# Patient Record
Sex: MALE | Race: WHITE
[De-identification: names, ages, dates, MRNs, and addresses within clinical notes are randomized per-mention and may not be internally consistent; named-entity substitution may affect disease eponyms.]

---

## 2017-12-20 ENCOUNTER — HOSPITAL ENCOUNTER (OUTPATIENT)
Age: 67
End: 2017-12-20
Payer: MEDICARE

## 2017-12-20 DIAGNOSIS — I51.7: ICD-10-CM

## 2017-12-20 DIAGNOSIS — Z79.01: Primary | ICD-10-CM

## 2017-12-20 DIAGNOSIS — Z51.81: ICD-10-CM

## 2017-12-20 PROCEDURE — 85610 PROTHROMBIN TIME: CPT

## 2017-12-20 PROCEDURE — G0463 HOSPITAL OUTPT CLINIC VISIT: HCPCS

## 2017-12-20 PROCEDURE — 99211 OFF/OP EST MAY X REQ PHY/QHP: CPT

## 2017-12-21 ENCOUNTER — HOSPITAL ENCOUNTER (INPATIENT)
Age: 67
LOS: 8 days | Discharge: HOME | DRG: 190 | End: 2017-12-29
Payer: MEDICARE

## 2017-12-21 DIAGNOSIS — J44.1: Primary | ICD-10-CM

## 2017-12-21 DIAGNOSIS — Z72.0: ICD-10-CM

## 2017-12-21 DIAGNOSIS — Z79.01: ICD-10-CM

## 2017-12-21 DIAGNOSIS — I50.9: ICD-10-CM

## 2017-12-21 DIAGNOSIS — I10: ICD-10-CM

## 2017-12-21 DIAGNOSIS — E87.6: ICD-10-CM

## 2017-12-21 DIAGNOSIS — I26.99: ICD-10-CM

## 2017-12-21 PROCEDURE — 71010: CPT

## 2017-12-21 PROCEDURE — 96375 TX/PRO/DX INJ NEW DRUG ADDON: CPT

## 2017-12-21 PROCEDURE — 93005 ELECTROCARDIOGRAM TRACING: CPT

## 2017-12-21 PROCEDURE — A9540 TC99M MAA: HCPCS

## 2017-12-21 PROCEDURE — 83880 ASSAY OF NATRIURETIC PEPTIDE: CPT

## 2017-12-21 PROCEDURE — 84484 ASSAY OF TROPONIN QUANT: CPT

## 2017-12-21 PROCEDURE — 87077 CULTURE AEROBIC IDENTIFY: CPT

## 2017-12-21 PROCEDURE — 80053 COMPREHEN METABOLIC PANEL: CPT

## 2017-12-21 PROCEDURE — 82553 CREATINE MB FRACTION: CPT

## 2017-12-21 PROCEDURE — 87205 SMEAR GRAM STAIN: CPT

## 2017-12-21 PROCEDURE — 70450 CT HEAD/BRAIN W/O DYE: CPT

## 2017-12-21 PROCEDURE — 87040 BLOOD CULTURE FOR BACTERIA: CPT

## 2017-12-21 PROCEDURE — 87070 CULTURE OTHR SPECIMN AEROBIC: CPT

## 2017-12-21 PROCEDURE — G0378 HOSPITAL OBSERVATION PER HR: HCPCS

## 2017-12-21 PROCEDURE — 72125 CT NECK SPINE W/O DYE: CPT

## 2017-12-21 PROCEDURE — 94640 AIRWAY INHALATION TREATMENT: CPT

## 2017-12-21 PROCEDURE — 96365 THER/PROPH/DIAG IV INF INIT: CPT

## 2017-12-21 PROCEDURE — 36415 COLL VENOUS BLD VENIPUNCTURE: CPT

## 2017-12-21 PROCEDURE — 82803 BLOOD GASES ANY COMBINATION: CPT

## 2017-12-21 PROCEDURE — 85025 COMPLETE CBC W/AUTO DIFF WBC: CPT

## 2017-12-21 PROCEDURE — 93970 EXTREMITY STUDY: CPT

## 2017-12-21 PROCEDURE — 99285 EMERGENCY DEPT VISIT HI MDM: CPT

## 2017-12-21 PROCEDURE — G0463 HOSPITAL OUTPT CLINIC VISIT: HCPCS

## 2017-12-21 PROCEDURE — 85610 PROTHROMBIN TIME: CPT

## 2017-12-21 PROCEDURE — 94760 N-INVAS EAR/PLS OXIMETRY 1: CPT

## 2017-12-21 PROCEDURE — 93041 RHYTHM ECG TRACING: CPT

## 2017-12-21 PROCEDURE — 83605 ASSAY OF LACTIC ACID: CPT

## 2017-12-21 PROCEDURE — 97163 PT EVAL HIGH COMPLEX 45 MIN: CPT

## 2017-12-21 PROCEDURE — 87186 SC STD MICRODIL/AGAR DIL: CPT

## 2017-12-21 PROCEDURE — 94660 CPAP INITIATION&MGMT: CPT

## 2017-12-21 PROCEDURE — 71020: CPT

## 2017-12-21 PROCEDURE — 78582 LUNG VENTILAT&PERFUS IMAGING: CPT

## 2017-12-21 PROCEDURE — 97530 THERAPEUTIC ACTIVITIES: CPT

## 2017-12-21 PROCEDURE — 85378 FIBRIN DEGRADE SEMIQUANT: CPT

## 2017-12-21 PROCEDURE — 99211 OFF/OP EST MAY X REQ PHY/QHP: CPT

## 2017-12-21 PROCEDURE — 80048 BASIC METABOLIC PNL TOTAL CA: CPT

## 2017-12-21 PROCEDURE — 73562 X-RAY EXAM OF KNEE 3: CPT

## 2017-12-21 PROCEDURE — 82550 ASSAY OF CK (CPK): CPT

## 2017-12-21 PROCEDURE — A9567 TECHNETIUM TC-99M AEROSOL: HCPCS

## 2018-01-02 ENCOUNTER — HOSPITAL ENCOUNTER (OUTPATIENT)
Dept: HOSPITAL 22 - ACC | Age: 68
Discharge: HOME | End: 2018-01-02
Payer: MEDICARE

## 2018-01-02 DIAGNOSIS — Z79.01: ICD-10-CM

## 2018-01-02 DIAGNOSIS — Z51.81: Primary | ICD-10-CM

## 2018-01-02 DIAGNOSIS — I51.7: ICD-10-CM

## 2018-01-02 LAB — PHA INR FINGERSTICK: 3.8 (ref 0.9–1.1)

## 2018-01-02 PROCEDURE — 85610 PROTHROMBIN TIME: CPT

## 2018-01-18 ENCOUNTER — HOSPITAL ENCOUNTER (OUTPATIENT)
Age: 68
Discharge: HOME | End: 2018-01-18
Payer: MEDICARE

## 2018-01-18 DIAGNOSIS — Z79.01: Primary | ICD-10-CM

## 2018-01-18 LAB — PHA INR FINGERSTICK: 3.9 (ref 0.9–1.1)

## 2018-01-18 PROCEDURE — 99211 OFF/OP EST MAY X REQ PHY/QHP: CPT

## 2018-01-18 PROCEDURE — 85610 PROTHROMBIN TIME: CPT

## 2018-01-18 PROCEDURE — G0463 HOSPITAL OUTPT CLINIC VISIT: HCPCS

## 2018-02-01 ENCOUNTER — HOSPITAL ENCOUNTER (OUTPATIENT)
Dept: HOSPITAL 22 - ACC | Age: 68
Discharge: HOME | End: 2018-02-01
Payer: MEDICARE

## 2018-02-01 DIAGNOSIS — I51.7: ICD-10-CM

## 2018-02-01 DIAGNOSIS — Z79.01: Primary | ICD-10-CM

## 2018-02-01 DIAGNOSIS — Z51.81: ICD-10-CM

## 2018-02-01 LAB — PHA INR FINGERSTICK: 3 (ref 0.9–1.1)

## 2018-02-01 PROCEDURE — 85610 PROTHROMBIN TIME: CPT

## 2018-02-01 PROCEDURE — G0463 HOSPITAL OUTPT CLINIC VISIT: HCPCS

## 2018-02-01 PROCEDURE — 99211 OFF/OP EST MAY X REQ PHY/QHP: CPT

## 2018-02-14 ENCOUNTER — HOSPITAL ENCOUNTER (OUTPATIENT)
Dept: HOSPITAL 22 - ACC | Age: 68
Discharge: HOME | End: 2018-02-14
Payer: MEDICARE

## 2018-02-14 DIAGNOSIS — Z51.81: ICD-10-CM

## 2018-02-14 DIAGNOSIS — I51.7: ICD-10-CM

## 2018-02-14 DIAGNOSIS — Z79.01: Primary | ICD-10-CM

## 2018-02-14 LAB — PHA INR FINGERSTICK: 2 (ref 0.9–1.1)

## 2018-02-14 PROCEDURE — 99211 OFF/OP EST MAY X REQ PHY/QHP: CPT

## 2018-02-14 PROCEDURE — 85610 PROTHROMBIN TIME: CPT

## 2018-02-14 PROCEDURE — G0463 HOSPITAL OUTPT CLINIC VISIT: HCPCS

## 2018-02-26 ENCOUNTER — HOSPITAL ENCOUNTER (OUTPATIENT)
Age: 68
End: 2018-02-26
Payer: MEDICARE

## 2018-02-26 DIAGNOSIS — Z79.01: ICD-10-CM

## 2018-02-26 DIAGNOSIS — N18.3: Primary | ICD-10-CM

## 2018-02-26 LAB
ALBUMIN LEVEL: 2.7 GM/DL (ref 3.4–5)
ANION GAP SERPL CALC-SCNC: 13.1 MEQ/L (ref 5–15)
BUN SERPL-MCNC: 61 MG/DL (ref 7–18)
CALCIUM SPEC-MCNC: 8.3 MG/DL (ref 8.5–10.1)
CHLORIDE SPEC-SCNC: 103 MMOL/L (ref 98–107)
CO2 SERPL-SCNC: 28 MMOL/L (ref 21–32)
COLOR UR: YELLOW
CREAT BLD-SCNC: 2.14 MG/DL (ref 0.7–1.3)
ESTIMATED GLOMERULAR FILT RATE: 31 ML/MIN (ref 60–?)
GFR (AFRICAN AMERICAN): 38 ML/MIN (ref 60–?)
GLUCOSE: 100 MG/DL (ref 74–106)
HCT VFR BLD CALC: 30.3 % (ref 42–52)
HGB BLD-MCNC: 8.8 G/DL (ref 14.1–18)
HYALINE CASTS URNS QL MICRO: (no result) #/LPF
INR PPP: 2.02 (ref 0.9–1.1)
MCHC RBC-ENTMCNC: 28.9 G/DL (ref 31.8–35.4)
MCV RBC: 101.3 FL (ref 80–94)
MEAN CORPUSCULAR HEMOGLOBIN: 29.2 PG (ref 27–31.2)
MICRO URNS: (no result)
PH UR: 5 [PH] (ref 5–8.5)
PHOSPHOROUS: 4.2 MG/DL (ref 2.4–4.9)
PLATELET # BLD: 382 K/MM3 (ref 142–424)
POTASSIUM: 5.1 MMOL/L (ref 3.5–5.1)
PT BLD: 22 SECONDS (ref 9.4–11.8)
RBC # BLD AUTO: 3 M/MM3 (ref 4.6–6.2)
SODIUM SPEC-SCNC: 139 MMOL/L (ref 136–145)
SP GR UR: 1.01 (ref 1–1.03)
UROBILINOGEN UR QL: 0.2 EU/DL
WBC # BLD AUTO: 6.4 K/MM3 (ref 4.8–10.8)

## 2018-02-26 PROCEDURE — 80069 RENAL FUNCTION PANEL: CPT

## 2018-02-26 PROCEDURE — 81001 URINALYSIS AUTO W/SCOPE: CPT

## 2018-02-26 PROCEDURE — 85610 PROTHROMBIN TIME: CPT

## 2018-02-26 PROCEDURE — 85025 COMPLETE CBC W/AUTO DIFF WBC: CPT

## 2018-04-04 ENCOUNTER — HOSPITAL ENCOUNTER (OUTPATIENT)
Age: 68
LOS: 1 days | Discharge: HOME | End: 2018-04-05
Payer: MEDICARE

## 2018-04-04 DIAGNOSIS — I51.7: ICD-10-CM

## 2018-04-04 DIAGNOSIS — Z79.01: Primary | ICD-10-CM

## 2018-04-04 DIAGNOSIS — Z51.81: ICD-10-CM

## 2018-04-04 LAB — PHA INR FINGERSTICK: 2.7 (ref 0.9–1.1)

## 2018-04-04 PROCEDURE — 99211 OFF/OP EST MAY X REQ PHY/QHP: CPT

## 2018-04-04 PROCEDURE — G0463 HOSPITAL OUTPT CLINIC VISIT: HCPCS

## 2018-04-04 PROCEDURE — 85610 PROTHROMBIN TIME: CPT

## 2018-07-02 ENCOUNTER — HOSPITAL ENCOUNTER (OUTPATIENT)
Age: 68
End: 2018-07-02
Payer: MEDICARE

## 2018-07-02 DIAGNOSIS — N18.3: Primary | ICD-10-CM

## 2018-07-02 DIAGNOSIS — Z79.899: ICD-10-CM

## 2018-07-02 DIAGNOSIS — D64.9: ICD-10-CM

## 2018-07-02 LAB
ALBUMIN LEVEL: 3.6 GM/DL (ref 3.4–5)
ANION GAP SERPL CALC-SCNC: 12.3 MEQ/L (ref 5–15)
BUN SERPL-MCNC: 48 MG/DL (ref 7–18)
CALCIUM SPEC-MCNC: 8.9 MG/DL (ref 8.5–10.1)
CHLORIDE SPEC-SCNC: 103 MMOL/L (ref 98–107)
CO2 SERPL-SCNC: 25 MMOL/L (ref 21–32)
COLOR UR: YELLOW
CREAT BLD-SCNC: 1.24 MG/DL (ref 0.7–1.3)
ESTIMATED GLOMERULAR FILT RATE: 58 ML/MIN (ref 60–?)
FERRITIN SERPL-MCNC: 18 NG/ML (ref 8–388)
GFR (AFRICAN AMERICAN): 71 ML/MIN (ref 60–?)
GLUCOSE: 94 MG/DL (ref 74–106)
HCT VFR BLD CALC: 34.7 % (ref 42–52)
HGB BLD-MCNC: 10.5 G/DL (ref 14.1–18)
MCHC RBC-ENTMCNC: 30.2 G/DL (ref 31.8–35.4)
MCV RBC: 91.5 FL (ref 80–94)
MEAN CORPUSCULAR HEMOGLOBIN: 27.6 PG (ref 27–31.2)
MICRO URNS: (no result)
PH UR: 6 [PH] (ref 5–8.5)
PHOSPHOROUS: 3.6 MG/DL (ref 2.4–4.9)
PLATELET # BLD: 299 K/MM3 (ref 142–424)
POTASSIUM: 4.3 MMOL/L (ref 3.5–5.1)
RBC # BLD AUTO: 3.79 M/MM3 (ref 4.6–6.2)
SODIUM SPEC-SCNC: 136 MMOL/L (ref 136–145)
SP GR UR: <= 1.005 (ref 1–1.03)
URATE SERPL-SCNC: 6.3 MG/DL (ref 2.6–7.2)
UROBILINOGEN UR QL: 0.2 EU/DL
WBC # BLD AUTO: 5.5 K/MM3 (ref 4.8–10.8)

## 2018-07-02 PROCEDURE — 85025 COMPLETE CBC W/AUTO DIFF WBC: CPT

## 2018-07-02 PROCEDURE — 36415 COLL VENOUS BLD VENIPUNCTURE: CPT

## 2018-07-02 PROCEDURE — 84550 ASSAY OF BLOOD/URIC ACID: CPT

## 2018-07-02 PROCEDURE — 82043 UR ALBUMIN QUANTITATIVE: CPT

## 2018-07-02 PROCEDURE — 82570 ASSAY OF URINE CREATININE: CPT

## 2018-07-02 PROCEDURE — 83540 ASSAY OF IRON: CPT

## 2018-07-02 PROCEDURE — 83970 ASSAY OF PARATHORMONE: CPT

## 2018-07-02 PROCEDURE — 81001 URINALYSIS AUTO W/SCOPE: CPT

## 2018-07-02 PROCEDURE — 80069 RENAL FUNCTION PANEL: CPT

## 2018-07-02 PROCEDURE — 82652 VIT D 1 25-DIHYDROXY: CPT

## 2018-07-02 PROCEDURE — 82330 ASSAY OF CALCIUM: CPT

## 2018-07-02 PROCEDURE — 84155 ASSAY OF PROTEIN SERUM: CPT

## 2018-07-02 PROCEDURE — 83550 IRON BINDING TEST: CPT

## 2018-07-02 PROCEDURE — 82728 ASSAY OF FERRITIN: CPT

## 2018-07-03 LAB
IRON: 24 UG/DL (ref 38–169)
UIBC: 392 UG/DL (ref 111–343)

## 2018-07-04 LAB
CALCIUM, IONIZED: 5.4 MG/DL (ref 4.5–5.6)
PARATHYROID HORMONE INTACT: 95 PG/ML (ref 15–65)

## 2019-04-01 ENCOUNTER — HOSPITAL ENCOUNTER (OUTPATIENT)
Age: 69
End: 2019-04-01
Payer: MEDICARE

## 2019-04-01 DIAGNOSIS — N18.3: Primary | ICD-10-CM

## 2019-04-01 LAB
ALBUMIN LEVEL: 3 GM/DL (ref 3.4–5)
ANION GAP SERPL CALC-SCNC: 13.3 MEQ/L (ref 5–15)
BUN SERPL-MCNC: 27 MG/DL (ref 7–18)
CALCIUM SPEC-MCNC: 9.3 MG/DL (ref 8.5–10.1)
CHLORIDE SPEC-SCNC: 103 MMOL/L (ref 98–107)
CO2 SERPL-SCNC: 29 MMOL/L (ref 21–32)
COLOR UR: YELLOW
CREAT BLD-SCNC: 1.41 MG/DL (ref 0.7–1.3)
ESTIMATED GLOMERULAR FILT RATE: 50 ML/MIN (ref 60–?)
GFR (AFRICAN AMERICAN): 61 ML/MIN (ref 60–?)
GLUCOSE: 102 MG/DL (ref 74–106)
HCT VFR BLD CALC: 34 % (ref 42–52)
HGB BLD-MCNC: 9.9 G/DL (ref 14.1–18)
MCHC RBC-ENTMCNC: 29 G/DL (ref 31.8–35.4)
MCV RBC: 92.8 FL (ref 80–94)
MEAN CORPUSCULAR HEMOGLOBIN: 26.9 PG (ref 27–31.2)
MICRO URNS: (no result)
PH UR: 6 [PH] (ref 5–8.5)
PHOSPHOROUS: 3 MG/DL (ref 2.4–4.9)
PLATELET # BLD: 399 K/MM3 (ref 142–424)
POTASSIUM: 4.3 MMOL/L (ref 3.5–5.1)
RBC # BLD AUTO: 3.67 M/MM3 (ref 4.6–6.2)
SODIUM SPEC-SCNC: 141 MMOL/L (ref 136–145)
SP GR UR: <= 1.005 (ref 1–1.03)
UROBILINOGEN UR QL: 0.2 EU/DL
WBC # BLD AUTO: 4 K/MM3 (ref 4.8–10.8)

## 2019-04-01 PROCEDURE — 82570 ASSAY OF URINE CREATININE: CPT

## 2019-04-01 PROCEDURE — 85025 COMPLETE CBC W/AUTO DIFF WBC: CPT

## 2019-04-01 PROCEDURE — 82330 ASSAY OF CALCIUM: CPT

## 2019-04-01 PROCEDURE — 36415 COLL VENOUS BLD VENIPUNCTURE: CPT

## 2019-04-01 PROCEDURE — 81001 URINALYSIS AUTO W/SCOPE: CPT

## 2019-04-01 PROCEDURE — 82652 VIT D 1 25-DIHYDROXY: CPT

## 2019-04-01 PROCEDURE — 84155 ASSAY OF PROTEIN SERUM: CPT

## 2019-04-01 PROCEDURE — 80069 RENAL FUNCTION PANEL: CPT

## 2019-04-01 PROCEDURE — 83970 ASSAY OF PARATHORMONE: CPT

## 2019-04-02 LAB
CALCIUM, IONIZED: 5.6 MG/DL (ref 4.5–5.6)
PARATHYROID HORMONE INTACT: 78 PG/ML (ref 15–65)

## 2019-12-30 ENCOUNTER — HOSPITAL ENCOUNTER (OUTPATIENT)
Age: 69
End: 2019-12-30
Payer: MEDICARE

## 2019-12-30 DIAGNOSIS — N18.3: Primary | ICD-10-CM

## 2019-12-30 LAB
ALBUMIN LEVEL: 3.1 GM/DL (ref 3.4–5)
ANION GAP SERPL CALC-SCNC: 12.8 MEQ/L (ref 5–15)
BUN SERPL-MCNC: 49 MG/DL (ref 7–18)
CALCIUM SPEC-MCNC: 8.8 MG/DL (ref 8.5–10.1)
CHLORIDE SPEC-SCNC: 106 MMOL/L (ref 98–107)
CO2 SERPL-SCNC: 27 MMOL/L (ref 21–32)
COLOR UR: YELLOW
CREAT BLD-SCNC: 1.67 MG/DL (ref 0.7–1.3)
ESTIMATED GLOMERULAR FILT RATE: 41 ML/MIN (ref 60–?)
GFR (AFRICAN AMERICAN): 50 ML/MIN (ref 60–?)
GLUCOSE: 87 MG/DL (ref 74–106)
HCT VFR BLD CALC: 33.2 % (ref 42–52)
HGB BLD-MCNC: 9.5 G/DL (ref 14.1–18)
MCHC RBC-ENTMCNC: 28.6 G/DL (ref 31.8–35.4)
MCV RBC: 95.4 FL (ref 80–94)
MEAN CORPUSCULAR HEMOGLOBIN: 27.3 PG (ref 27–31.2)
MICRO URNS: (no result)
PH UR: 5.5 [PH] (ref 5–8.5)
PHOSPHOROUS: 3.8 MG/DL (ref 2.4–4.9)
PLATELET # BLD: 362 K/MM3 (ref 142–424)
POTASSIUM: 5.8 MMOL/L (ref 3.5–5.1)
RBC # BLD AUTO: 3.48 M/MM3 (ref 4.6–6.2)
SODIUM SPEC-SCNC: 140 MMOL/L (ref 136–145)
SP GR UR: 1.02 (ref 1–1.03)
UROBILINOGEN UR QL: 0.2 EU/DL
WBC # BLD AUTO: 5.5 K/MM3 (ref 4.8–10.8)
WBC # UR: (no result) #/HPF (ref 0–3)

## 2019-12-30 PROCEDURE — 84155 ASSAY OF PROTEIN SERUM: CPT

## 2019-12-30 PROCEDURE — 82570 ASSAY OF URINE CREATININE: CPT

## 2019-12-30 PROCEDURE — 85025 COMPLETE CBC W/AUTO DIFF WBC: CPT

## 2019-12-30 PROCEDURE — 80069 RENAL FUNCTION PANEL: CPT

## 2019-12-30 PROCEDURE — 36415 COLL VENOUS BLD VENIPUNCTURE: CPT

## 2019-12-30 PROCEDURE — 81001 URINALYSIS AUTO W/SCOPE: CPT

## 2020-01-10 ENCOUNTER — HOSPITAL ENCOUNTER (OUTPATIENT)
Age: 70
End: 2020-01-10
Payer: MEDICARE

## 2020-01-10 DIAGNOSIS — E87.5: Primary | ICD-10-CM

## 2020-01-10 LAB
ANION GAP SERPL CALC-SCNC: 13.9 MEQ/L (ref 5–15)
BUN SERPL-MCNC: 40 MG/DL (ref 7–18)
CALCIUM SPEC-MCNC: 8.6 MG/DL (ref 8.5–10.1)
CHLORIDE SPEC-SCNC: 104 MMOL/L (ref 98–107)
CO2 SERPL-SCNC: 28 MMOL/L (ref 21–32)
CREAT BLD-SCNC: 1.28 MG/DL (ref 0.7–1.3)
ESTIMATED GLOMERULAR FILT RATE: 56 ML/MIN (ref 60–?)
GFR (AFRICAN AMERICAN): 68 ML/MIN (ref 60–?)
GLUCOSE: 84 MG/DL (ref 74–106)
POTASSIUM: 4.9 MMOL/L (ref 3.5–5.1)
SODIUM SPEC-SCNC: 141 MMOL/L (ref 136–145)

## 2020-01-10 PROCEDURE — 80048 BASIC METABOLIC PNL TOTAL CA: CPT

## 2020-01-10 PROCEDURE — 36415 COLL VENOUS BLD VENIPUNCTURE: CPT

## 2020-07-06 ENCOUNTER — HOSPITAL ENCOUNTER (OUTPATIENT)
Age: 70
End: 2020-07-06
Payer: MEDICARE

## 2020-07-06 DIAGNOSIS — D50.9: ICD-10-CM

## 2020-07-06 DIAGNOSIS — N18.3: Primary | ICD-10-CM

## 2020-07-06 LAB
25-OH VITAMIN D, TOTAL: 57.2 NG/ML (ref 30–100)
ALBUMIN LEVEL: 3.7 G/DL (ref 3.5–5)
ANION GAP SERPL CALC-SCNC: 13 MEQ/L (ref 5–15)
BUN SERPL-MCNC: 37 MG/DL (ref 9–20)
CALCIUM SPEC-MCNC: 9.2 MG/DL (ref 8.4–10.2)
CHLORIDE SPEC-SCNC: 105 MMOL/L (ref 98–107)
CO2 SERPL-SCNC: 27 MMOL/L (ref 22–30)
COLOR UR: YELLOW
CREAT BLD-SCNC: 1.3 MG/DL (ref 0.66–1.25)
ESTIMATED GLOMERULAR FILT RATE: 55 ML/MIN (ref 60–?)
FERRITIN SERPL-MCNC: 8.33 NG/ML (ref 17.9–464)
GFR (AFRICAN AMERICAN): 66 ML/MIN (ref 60–?)
GLUCOSE: 89 MG/DL (ref 74–100)
HCT VFR BLD CALC: 31.5 % (ref 42–52)
HGB BLD-MCNC: 9.6 G/DL (ref 14.1–18)
HYALINE CASTS URNS QL MICRO: (no result) #/LPF
MCHC RBC-ENTMCNC: 30.5 G/DL (ref 31.8–35.4)
MCV RBC: 99.7 FL (ref 80–94)
MEAN CORPUSCULAR HEMOGLOBIN: 30.4 PG (ref 27–31.2)
MICRO URNS: (no result)
PH UR: 6 [PH] (ref 5–8.5)
PHOSPHOROUS: 4.2 MG/DL (ref 2.5–4.5)
PLATELET # BLD: 307 K/MM3 (ref 142–424)
POTASSIUM: 5 MMOL/L (ref 3.5–5.1)
RBC # BLD AUTO: 3.16 M/MM3 (ref 4.6–6.2)
SODIUM SPEC-SCNC: 140 MMOL/L (ref 136–145)
SP GR UR: 1.02 (ref 1–1.03)
TOTAL IRON BINDING CAPACITY: 384 UG/DL (ref 261–462)
UROBILINOGEN UR QL: 0.2 EU/DL
WBC # BLD AUTO: 4.3 K/MM3 (ref 4.8–10.8)

## 2020-07-06 PROCEDURE — 85025 COMPLETE CBC W/AUTO DIFF WBC: CPT

## 2020-07-06 PROCEDURE — 83970 ASSAY OF PARATHORMONE: CPT

## 2020-07-06 PROCEDURE — 82330 ASSAY OF CALCIUM: CPT

## 2020-07-06 PROCEDURE — 82728 ASSAY OF FERRITIN: CPT

## 2020-07-06 PROCEDURE — 36415 COLL VENOUS BLD VENIPUNCTURE: CPT

## 2020-07-06 PROCEDURE — 80069 RENAL FUNCTION PANEL: CPT

## 2020-07-06 PROCEDURE — 82306 VITAMIN D 25 HYDROXY: CPT

## 2020-07-06 PROCEDURE — 82570 ASSAY OF URINE CREATININE: CPT

## 2020-07-06 PROCEDURE — 84155 ASSAY OF PROTEIN SERUM: CPT

## 2020-07-06 PROCEDURE — 81001 URINALYSIS AUTO W/SCOPE: CPT

## 2020-07-06 PROCEDURE — 83550 IRON BINDING TEST: CPT

## 2020-07-08 LAB — CALCIUM, IONIZED: 5.4 MG/DL (ref 4.5–5.6)

## 2022-01-01 ENCOUNTER — LAB (OUTPATIENT)
Dept: LAB | Facility: HOSPITAL | Age: 72
End: 2022-01-01

## 2022-01-01 ENCOUNTER — HOME CARE VISIT (OUTPATIENT)
Dept: HOME HEALTH SERVICES | Facility: HOME HEALTHCARE | Age: 72
End: 2022-01-01

## 2022-01-01 ENCOUNTER — HOSPITAL ENCOUNTER (OUTPATIENT)
Dept: ONCOLOGY | Facility: HOSPITAL | Age: 72
Setting detail: INFUSION SERIES
Discharge: HOME | End: 2022-05-02

## 2022-01-01 ENCOUNTER — OFFICE VISIT (OUTPATIENT)
Dept: ONCOLOGY | Facility: CLINIC | Age: 72
End: 2022-01-01

## 2022-01-01 ENCOUNTER — TELEPHONE (OUTPATIENT)
Dept: ONCOLOGY | Facility: OTHER | Age: 72
End: 2022-01-01

## 2022-01-01 ENCOUNTER — NURSING HOME (OUTPATIENT)
Dept: INTERNAL MEDICINE | Facility: CLINIC | Age: 72
End: 2022-01-01

## 2022-01-01 ENCOUNTER — HOSPITAL ENCOUNTER (OUTPATIENT)
Dept: ONCOLOGY | Facility: HOSPITAL | Age: 72
Setting detail: INFUSION SERIES
Discharge: HOME | End: 2022-06-06

## 2022-01-01 ENCOUNTER — HOSPITAL ENCOUNTER (OUTPATIENT)
Dept: ONCOLOGY | Facility: HOSPITAL | Age: 72
Setting detail: INFUSION SERIES
Discharge: HOME | End: 2022-05-09

## 2022-01-01 ENCOUNTER — HOSPITAL ENCOUNTER (OUTPATIENT)
Dept: ONCOLOGY | Facility: HOSPITAL | Age: 72
Setting detail: INFUSION SERIES
Discharge: HOME | End: 2022-05-23

## 2022-01-01 ENCOUNTER — HOSPITAL ENCOUNTER (OUTPATIENT)
Dept: ONCOLOGY | Facility: HOSPITAL | Age: 72
Setting detail: INFUSION SERIES
Discharge: HOME | End: 2022-05-31

## 2022-01-01 ENCOUNTER — HOME HEALTH ADMISSION (OUTPATIENT)
Dept: HOME HEALTH SERVICES | Facility: HOME HEALTHCARE | Age: 72
End: 2022-01-01

## 2022-01-01 ENCOUNTER — CONSULT (OUTPATIENT)
Dept: ONCOLOGY | Facility: CLINIC | Age: 72
End: 2022-01-01

## 2022-01-01 ENCOUNTER — TELEPHONE (OUTPATIENT)
Dept: ONCOLOGY | Facility: CLINIC | Age: 72
End: 2022-01-01

## 2022-01-01 ENCOUNTER — TRANSCRIBE ORDERS (OUTPATIENT)
Dept: HOME HEALTH SERVICES | Facility: HOME HEALTHCARE | Age: 72
End: 2022-01-01

## 2022-01-01 ENCOUNTER — HOSPITAL ENCOUNTER (OUTPATIENT)
Dept: ONCOLOGY | Facility: HOSPITAL | Age: 72
Setting detail: INFUSION SERIES
Discharge: HOME | End: 2022-05-16

## 2022-01-01 ENCOUNTER — DOCUMENTATION (OUTPATIENT)
Dept: FAMILY MEDICINE CLINIC | Facility: CLINIC | Age: 72
End: 2022-01-01

## 2022-01-01 VITALS
RESPIRATION RATE: 17 BRPM | SYSTOLIC BLOOD PRESSURE: 133 MMHG | TEMPERATURE: 98.1 F | OXYGEN SATURATION: 96 % | DIASTOLIC BLOOD PRESSURE: 70 MMHG | HEART RATE: 73 BPM

## 2022-01-01 VITALS
DIASTOLIC BLOOD PRESSURE: 58 MMHG | BODY MASS INDEX: 20.76 KG/M2 | RESPIRATION RATE: 22 BRPM | HEIGHT: 70 IN | WEIGHT: 145 LBS | OXYGEN SATURATION: 95 % | SYSTOLIC BLOOD PRESSURE: 105 MMHG | HEART RATE: 106 BPM | TEMPERATURE: 97.4 F

## 2022-01-01 VITALS
HEART RATE: 97 BPM | RESPIRATION RATE: 20 BRPM | TEMPERATURE: 98 F | SYSTOLIC BLOOD PRESSURE: 122 MMHG | DIASTOLIC BLOOD PRESSURE: 76 MMHG | OXYGEN SATURATION: 93 %

## 2022-01-01 VITALS
SYSTOLIC BLOOD PRESSURE: 120 MMHG | RESPIRATION RATE: 16 BRPM | TEMPERATURE: 98.3 F | OXYGEN SATURATION: 91 % | HEART RATE: 127 BPM | DIASTOLIC BLOOD PRESSURE: 90 MMHG

## 2022-01-01 VITALS
WEIGHT: 139 LBS | BODY MASS INDEX: 19.9 KG/M2 | DIASTOLIC BLOOD PRESSURE: 50 MMHG | RESPIRATION RATE: 18 BRPM | HEIGHT: 70 IN | TEMPERATURE: 97.9 F | HEART RATE: 72 BPM | SYSTOLIC BLOOD PRESSURE: 113 MMHG

## 2022-01-01 VITALS
HEART RATE: 65 BPM | DIASTOLIC BLOOD PRESSURE: 60 MMHG | RESPIRATION RATE: 18 BRPM | TEMPERATURE: 96.9 F | OXYGEN SATURATION: 92 % | SYSTOLIC BLOOD PRESSURE: 111 MMHG

## 2022-01-01 VITALS
OXYGEN SATURATION: 90 % | DIASTOLIC BLOOD PRESSURE: 68 MMHG | RESPIRATION RATE: 16 BRPM | SYSTOLIC BLOOD PRESSURE: 112 MMHG | HEART RATE: 67 BPM | TEMPERATURE: 98.2 F

## 2022-01-01 VITALS
WEIGHT: 139 LBS | SYSTOLIC BLOOD PRESSURE: 100 MMHG | RESPIRATION RATE: 18 BRPM | HEIGHT: 70 IN | DIASTOLIC BLOOD PRESSURE: 47 MMHG | BODY MASS INDEX: 19.9 KG/M2 | HEART RATE: 71 BPM | TEMPERATURE: 98.4 F

## 2022-01-01 VITALS
DIASTOLIC BLOOD PRESSURE: 58 MMHG | SYSTOLIC BLOOD PRESSURE: 98 MMHG | HEIGHT: 70 IN | BODY MASS INDEX: 20 KG/M2 | HEART RATE: 84 BPM | OXYGEN SATURATION: 95 % | WEIGHT: 139.7 LBS | RESPIRATION RATE: 16 BRPM | TEMPERATURE: 98 F

## 2022-01-01 VITALS
DIASTOLIC BLOOD PRESSURE: 61 MMHG | TEMPERATURE: 98 F | RESPIRATION RATE: 16 BRPM | BODY MASS INDEX: 19.9 KG/M2 | HEIGHT: 70 IN | WEIGHT: 139 LBS | SYSTOLIC BLOOD PRESSURE: 122 MMHG | HEART RATE: 68 BPM

## 2022-01-01 VITALS
DIASTOLIC BLOOD PRESSURE: 50 MMHG | WEIGHT: 146 LBS | TEMPERATURE: 98 F | HEIGHT: 70 IN | BODY MASS INDEX: 20.9 KG/M2 | HEART RATE: 62 BPM | RESPIRATION RATE: 16 BRPM | SYSTOLIC BLOOD PRESSURE: 116 MMHG

## 2022-01-01 VITALS
OXYGEN SATURATION: 93 % | TEMPERATURE: 98.5 F | SYSTOLIC BLOOD PRESSURE: 134 MMHG | HEART RATE: 81 BPM | DIASTOLIC BLOOD PRESSURE: 86 MMHG | RESPIRATION RATE: 16 BRPM

## 2022-01-01 VITALS
HEART RATE: 83 BPM | SYSTOLIC BLOOD PRESSURE: 128 MMHG | DIASTOLIC BLOOD PRESSURE: 78 MMHG | RESPIRATION RATE: 16 BRPM | TEMPERATURE: 97.9 F | OXYGEN SATURATION: 95 %

## 2022-01-01 VITALS
DIASTOLIC BLOOD PRESSURE: 82 MMHG | RESPIRATION RATE: 22 BRPM | SYSTOLIC BLOOD PRESSURE: 137 MMHG | OXYGEN SATURATION: 94 % | HEART RATE: 85 BPM | TEMPERATURE: 97.9 F

## 2022-01-01 VITALS
SYSTOLIC BLOOD PRESSURE: 100 MMHG | OXYGEN SATURATION: 93 % | DIASTOLIC BLOOD PRESSURE: 80 MMHG | RESPIRATION RATE: 18 BRPM | HEART RATE: 90 BPM

## 2022-01-01 VITALS
TEMPERATURE: 97.9 F | WEIGHT: 146 LBS | HEART RATE: 76 BPM | SYSTOLIC BLOOD PRESSURE: 108 MMHG | RESPIRATION RATE: 18 BRPM | BODY MASS INDEX: 20.9 KG/M2 | HEIGHT: 70 IN | DIASTOLIC BLOOD PRESSURE: 47 MMHG

## 2022-01-01 VITALS
TEMPERATURE: 98.7 F | HEART RATE: 89 BPM | OXYGEN SATURATION: 94 % | SYSTOLIC BLOOD PRESSURE: 121 MMHG | RESPIRATION RATE: 18 BRPM | DIASTOLIC BLOOD PRESSURE: 59 MMHG

## 2022-01-01 VITALS
WEIGHT: 139 LBS | HEIGHT: 70 IN | DIASTOLIC BLOOD PRESSURE: 57 MMHG | TEMPERATURE: 97.5 F | BODY MASS INDEX: 19.9 KG/M2 | RESPIRATION RATE: 16 BRPM | HEART RATE: 66 BPM | SYSTOLIC BLOOD PRESSURE: 111 MMHG

## 2022-01-01 VITALS
RESPIRATION RATE: 18 BRPM | HEART RATE: 88 BPM | DIASTOLIC BLOOD PRESSURE: 64 MMHG | OXYGEN SATURATION: 87 % | SYSTOLIC BLOOD PRESSURE: 110 MMHG

## 2022-01-01 VITALS
HEIGHT: 70 IN | RESPIRATION RATE: 24 BRPM | DIASTOLIC BLOOD PRESSURE: 52 MMHG | SYSTOLIC BLOOD PRESSURE: 95 MMHG | HEART RATE: 74 BPM | BODY MASS INDEX: 19.33 KG/M2 | TEMPERATURE: 97.5 F | OXYGEN SATURATION: 88 % | WEIGHT: 135 LBS

## 2022-01-01 VITALS
DIASTOLIC BLOOD PRESSURE: 68 MMHG | TEMPERATURE: 97.9 F | HEART RATE: 54 BPM | RESPIRATION RATE: 18 BRPM | OXYGEN SATURATION: 97 % | SYSTOLIC BLOOD PRESSURE: 114 MMHG

## 2022-01-01 VITALS
HEART RATE: 84 BPM | SYSTOLIC BLOOD PRESSURE: 140 MMHG | OXYGEN SATURATION: 92 % | RESPIRATION RATE: 16 BRPM | DIASTOLIC BLOOD PRESSURE: 78 MMHG

## 2022-01-01 VITALS
RESPIRATION RATE: 17 BRPM | TEMPERATURE: 98.1 F | OXYGEN SATURATION: 96 % | DIASTOLIC BLOOD PRESSURE: 68 MMHG | HEART RATE: 72 BPM | SYSTOLIC BLOOD PRESSURE: 122 MMHG

## 2022-01-01 DIAGNOSIS — R31.0 GROSS HEMATURIA: ICD-10-CM

## 2022-01-01 DIAGNOSIS — J18.9 UNRESOLVED PNEUMONIA: Primary | ICD-10-CM

## 2022-01-01 DIAGNOSIS — K90.9 MALABSORPTION OF IRON: ICD-10-CM

## 2022-01-01 DIAGNOSIS — D50.8 OTHER IRON DEFICIENCY ANEMIA: Primary | ICD-10-CM

## 2022-01-01 DIAGNOSIS — R79.89 OTHER SPECIFIED ABNORMAL FINDINGS OF BLOOD CHEMISTRY: ICD-10-CM

## 2022-01-01 DIAGNOSIS — E87.5 HYPERKALEMIA: ICD-10-CM

## 2022-01-01 DIAGNOSIS — N18.31 STAGE 3A CHRONIC KIDNEY DISEASE: ICD-10-CM

## 2022-01-01 DIAGNOSIS — D50.8 OTHER IRON DEFICIENCY ANEMIA: ICD-10-CM

## 2022-01-01 DIAGNOSIS — I48.0 PAROXYSMAL ATRIAL FIBRILLATION: ICD-10-CM

## 2022-01-01 DIAGNOSIS — I27.21 WHO GROUP 1 PULMONARY ARTERIAL HYPERTENSION: ICD-10-CM

## 2022-01-01 DIAGNOSIS — I50.22 CHRONIC SYSTOLIC (CONGESTIVE) HEART FAILURE: Primary | ICD-10-CM

## 2022-01-01 LAB
ALBUMIN SERPL ELPH-MCNC: 2.9 G/DL (ref 2.9–4.4)
ALBUMIN SERPL-MCNC: 3.2 G/DL (ref 3.5–5.2)
ALBUMIN/GLOB SERPL: 0.7 G/DL
ALBUMIN/GLOB SERPL: 0.7 {RATIO} (ref 0.7–1.7)
ALP SERPL-CCNC: 85 U/L (ref 39–117)
ALPHA1 GLOB SERPL ELPH-MCNC: 0.4 G/DL (ref 0–0.4)
ALPHA2 GLOB SERPL ELPH-MCNC: 0.8 G/DL (ref 0.4–1)
ALT SERPL W P-5'-P-CCNC: 14 U/L (ref 1–41)
ANION GAP SERPL CALCULATED.3IONS-SCNC: 8 MMOL/L (ref 5–15)
AST SERPL-CCNC: 20 U/L (ref 1–40)
B-GLOBULIN SERPL ELPH-MCNC: 1 G/DL (ref 0.7–1.3)
B2 MICROGLOB SERPL-MCNC: 12.2 MG/L (ref 0.8–2.2)
BILIRUB SERPL-MCNC: 0.2 MG/DL (ref 0–1.2)
BUN SERPL-MCNC: 51 MG/DL (ref 8–23)
BUN/CREAT SERPL: 27.6 (ref 7–25)
CALCIUM SPEC-SCNC: 9.7 MG/DL (ref 8.6–10.5)
CEA SERPL-MCNC: 6.19 NG/ML
CHLORIDE SERPL-SCNC: 102 MMOL/L (ref 98–107)
CO2 SERPL-SCNC: 27 MMOL/L (ref 22–29)
CREAT SERPL-MCNC: 1.85 MG/DL (ref 0.76–1.27)
CYTOLOGIST CVX/VAG CYTO: NORMAL
EGFRCR SERPLBLD CKD-EPI 2021: 38.7 ML/MIN/1.73
ERYTHROCYTE [DISTWIDTH] IN BLOOD BY AUTOMATED COUNT: 16 % (ref 12.3–15.4)
ERYTHROCYTE [DISTWIDTH] IN BLOOD BY AUTOMATED COUNT: 17.5 % (ref 12.3–15.4)
ERYTHROCYTE [DISTWIDTH] IN BLOOD BY AUTOMATED COUNT: 17.6 % (ref 12.3–15.4)
FERRITIN SERPL-MCNC: 107.2 NG/ML (ref 30–400)
FERRITIN SERPL-MCNC: 37.14 NG/ML (ref 30–400)
FERRITIN SERPL-MCNC: 42.5 NG/ML (ref 30–400)
FOLATE SERPL-MCNC: >20 NG/ML (ref 4.78–24.2)
GAMMA GLOB SERPL ELPH-MCNC: 2 G/DL (ref 0.4–1.8)
GLOBULIN SER-MCNC: 4.2 G/DL (ref 2.2–3.9)
GLOBULIN UR ELPH-MCNC: 4.3 GM/DL
GLUCOSE SERPL-MCNC: 113 MG/DL (ref 65–99)
HAPTOGLOB SERPL-MCNC: 138 MG/DL (ref 30–200)
HCT VFR BLD AUTO: 25.6 % (ref 37.5–51)
HCT VFR BLD AUTO: 27.7 % (ref 37.5–51)
HCT VFR BLD AUTO: 29.8 % (ref 37.5–51)
HGB BLD-MCNC: 8.1 G/DL (ref 13–17.7)
HGB BLD-MCNC: 8.1 G/DL (ref 13–17.7)
HGB BLD-MCNC: 9.1 G/DL (ref 13–17.7)
IGA SERPL-MCNC: 390 MG/DL (ref 61–437)
IGG SERPL-MCNC: 1966 MG/DL (ref 603–1613)
IGM SERPL-MCNC: 115 MG/DL (ref 20–172)
INTERPRETATION SERPL IEP-IMP: ABNORMAL
IRON 24H UR-MRATE: 20 MCG/DL (ref 59–158)
IRON 24H UR-MRATE: 24 MCG/DL (ref 59–158)
IRON 24H UR-MRATE: 30 MCG/DL (ref 59–158)
IRON SATN MFR SERPL: 11 % (ref 20–50)
IRON SATN MFR SERPL: 5 % (ref 20–50)
IRON SATN MFR SERPL: 6 % (ref 20–50)
KAPPA LC FREE SER-MCNC: 222.2 MG/L (ref 3.3–19.4)
KAPPA LC FREE/LAMBDA FREE SER: 2.05 {RATIO} (ref 0.26–1.65)
LABORATORY COMMENT REPORT: ABNORMAL
LAMBDA LC FREE SERPL-MCNC: 108.3 MG/L (ref 5.7–26.3)
LDH SERPL-CCNC: 208 U/L (ref 135–225)
LYMPHOCYTES # BLD AUTO: 1.2 10*3/MM3 (ref 0.7–3.1)
LYMPHOCYTES # BLD AUTO: 1.3 10*3/MM3 (ref 0.7–3.1)
LYMPHOCYTES # BLD AUTO: 1.7 10*3/MM3 (ref 0.7–3.1)
LYMPHOCYTES NFR BLD AUTO: 15.2 % (ref 19.6–45.3)
LYMPHOCYTES NFR BLD AUTO: 23.5 % (ref 19.6–45.3)
LYMPHOCYTES NFR BLD AUTO: 35.4 % (ref 19.6–45.3)
M PROTEIN SERPL ELPH-MCNC: ABNORMAL G/DL
MCH RBC QN AUTO: 28.6 PG (ref 26.6–33)
MCH RBC QN AUTO: 29.1 PG (ref 26.6–33)
MCH RBC QN AUTO: 30.7 PG (ref 26.6–33)
MCHC RBC AUTO-ENTMCNC: 29.4 G/DL (ref 31.5–35.7)
MCHC RBC AUTO-ENTMCNC: 30.5 G/DL (ref 31.5–35.7)
MCHC RBC AUTO-ENTMCNC: 31.8 G/DL (ref 31.5–35.7)
MCV RBC AUTO: 93.6 FL (ref 79–97)
MCV RBC AUTO: 96.7 FL (ref 79–97)
MCV RBC AUTO: 98.9 FL (ref 79–97)
MONOCYTES # BLD AUTO: 0.3 10*3/MM3 (ref 0.1–0.9)
MONOCYTES # BLD AUTO: 0.4 10*3/MM3 (ref 0.1–0.9)
MONOCYTES # BLD AUTO: 0.5 10*3/MM3 (ref 0.1–0.9)
MONOCYTES NFR BLD AUTO: 6.2 % (ref 5–12)
MONOCYTES NFR BLD AUTO: 7 % (ref 5–12)
MONOCYTES NFR BLD AUTO: 8 % (ref 5–12)
NEUTROPHILS NFR BLD AUTO: 2.8 10*3/MM3 (ref 1.7–7)
NEUTROPHILS NFR BLD AUTO: 3.7 10*3/MM3 (ref 1.7–7)
NEUTROPHILS NFR BLD AUTO: 57.6 % (ref 42.7–76)
NEUTROPHILS NFR BLD AUTO: 6.1 10*3/MM3 (ref 1.7–7)
NEUTROPHILS NFR BLD AUTO: 68.5 % (ref 42.7–76)
NEUTROPHILS NFR BLD AUTO: 78.6 % (ref 42.7–76)
PATH INTERP BLD-IMP: NORMAL
PLATELET # BLD AUTO: 427 10*3/MM3 (ref 140–450)
PLATELET # BLD AUTO: 446 10*3/MM3 (ref 140–450)
PLATELET # BLD AUTO: 449 10*3/MM3 (ref 140–450)
PMV BLD AUTO: 5.7 FL (ref 6–12)
PMV BLD AUTO: 6.1 FL (ref 6–12)
PMV BLD AUTO: 6.3 FL (ref 6–12)
POTASSIUM SERPL-SCNC: 4.8 MMOL/L (ref 3.5–5.2)
PROT SERPL-MCNC: 7.1 G/DL (ref 6–8.5)
PROT SERPL-MCNC: 7.5 G/DL (ref 6–8.5)
RBC # BLD AUTO: 2.65 10*6/MM3 (ref 4.14–5.8)
RBC # BLD AUTO: 2.8 10*6/MM3 (ref 4.14–5.8)
RBC # BLD AUTO: 3.18 10*6/MM3 (ref 4.14–5.8)
SODIUM SERPL-SCNC: 137 MMOL/L (ref 136–145)
TIBC SERPL-MCNC: 285 MCG/DL (ref 298–536)
TIBC SERPL-MCNC: 387 MCG/DL (ref 298–536)
TIBC SERPL-MCNC: 410 MCG/DL (ref 298–536)
TRANSFERRIN SERPL-MCNC: 191 MG/DL (ref 200–360)
TRANSFERRIN SERPL-MCNC: 260 MG/DL (ref 200–360)
TRANSFERRIN SERPL-MCNC: 275 MG/DL (ref 200–360)
VIT B12 BLD-MCNC: 848 PG/ML (ref 211–946)
WBC NRBC COR # BLD: 4.9 10*3/MM3 (ref 3.4–10.8)
WBC NRBC COR # BLD: 5.4 10*3/MM3 (ref 3.4–10.8)
WBC NRBC COR # BLD: 7.8 10*3/MM3 (ref 3.4–10.8)

## 2022-01-01 PROCEDURE — 96375 TX/PRO/DX INJ NEW DRUG ADDON: CPT

## 2022-01-01 PROCEDURE — 82784 ASSAY IGA/IGD/IGG/IGM EACH: CPT

## 2022-01-01 PROCEDURE — G0151 HHCP-SERV OF PT,EA 15 MIN: HCPCS

## 2022-01-01 PROCEDURE — G0153 HHCP-SVS OF S/L PATH,EA 15MN: HCPCS

## 2022-01-01 PROCEDURE — G0299 HHS/HOSPICE OF RN EA 15 MIN: HCPCS

## 2022-01-01 PROCEDURE — 82728 ASSAY OF FERRITIN: CPT

## 2022-01-01 PROCEDURE — 85025 COMPLETE CBC W/AUTO DIFF WBC: CPT

## 2022-01-01 PROCEDURE — 25010000002 NA FERRIC GLUC CPLX PER 12.5 MG: Performed by: INTERNAL MEDICINE

## 2022-01-01 PROCEDURE — 80053 COMPREHEN METABOLIC PANEL: CPT

## 2022-01-01 PROCEDURE — G0152 HHCP-SERV OF OT,EA 15 MIN: HCPCS

## 2022-01-01 PROCEDURE — 84466 ASSAY OF TRANSFERRIN: CPT

## 2022-01-01 PROCEDURE — 96365 THER/PROPH/DIAG IV INF INIT: CPT

## 2022-01-01 PROCEDURE — 85060 BLOOD SMEAR INTERPRETATION: CPT

## 2022-01-01 PROCEDURE — 83521 IG LIGHT CHAINS FREE EACH: CPT

## 2022-01-01 PROCEDURE — 83540 ASSAY OF IRON: CPT

## 2022-01-01 PROCEDURE — 96366 THER/PROPH/DIAG IV INF ADDON: CPT

## 2022-01-01 PROCEDURE — 36415 COLL VENOUS BLD VENIPUNCTURE: CPT

## 2022-01-01 PROCEDURE — 25010000002 DEXAMETHASONE SODIUM PHOSPHATE 100 MG/10ML SOLUTION: Performed by: INTERNAL MEDICINE

## 2022-01-01 PROCEDURE — G0155 HHCP-SVS OF CSW,EA 15 MIN: HCPCS

## 2022-01-01 PROCEDURE — 82232 ASSAY OF BETA-2 PROTEIN: CPT

## 2022-01-01 PROCEDURE — 96367 TX/PROPH/DG ADDL SEQ IV INF: CPT

## 2022-01-01 PROCEDURE — 86334 IMMUNOFIX E-PHORESIS SERUM: CPT

## 2022-01-01 PROCEDURE — 99306 1ST NF CARE HIGH MDM 50: CPT | Performed by: INTERNAL MEDICINE

## 2022-01-01 PROCEDURE — 82607 VITAMIN B-12: CPT

## 2022-01-01 PROCEDURE — 83010 ASSAY OF HAPTOGLOBIN QUANT: CPT

## 2022-01-01 PROCEDURE — 82746 ASSAY OF FOLIC ACID SERUM: CPT

## 2022-01-01 PROCEDURE — G0156 HHCP-SVS OF AIDE,EA 15 MIN: HCPCS

## 2022-01-01 PROCEDURE — 99214 OFFICE O/P EST MOD 30 MIN: CPT | Performed by: INTERNAL MEDICINE

## 2022-01-01 PROCEDURE — 99204 OFFICE O/P NEW MOD 45 MIN: CPT | Performed by: INTERNAL MEDICINE

## 2022-01-01 PROCEDURE — 84165 PROTEIN E-PHORESIS SERUM: CPT

## 2022-01-01 PROCEDURE — 82378 CARCINOEMBRYONIC ANTIGEN: CPT

## 2022-01-01 PROCEDURE — 83615 LACTATE (LD) (LDH) ENZYME: CPT

## 2022-01-01 PROCEDURE — 25010000002 DIPHENHYDRAMINE PER 50 MG: Performed by: INTERNAL MEDICINE

## 2022-01-01 RX ORDER — SODIUM CHLORIDE 9 MG/ML
250 INJECTION, SOLUTION INTRAVENOUS ONCE
Status: COMPLETED | OUTPATIENT
Start: 2022-01-01 | End: 2022-01-01

## 2022-01-01 RX ORDER — SILDENAFIL CITRATE 20 MG/1
40 TABLET ORAL 3 TIMES DAILY
COMMUNITY

## 2022-01-01 RX ORDER — ACETAMINOPHEN 325 MG/1
650 TABLET ORAL ONCE
Status: COMPLETED | OUTPATIENT
Start: 2022-01-01 | End: 2022-01-01

## 2022-01-01 RX ORDER — ACETAMINOPHEN 325 MG/1
650 TABLET ORAL ONCE
Status: CANCELLED | OUTPATIENT
Start: 2022-01-01

## 2022-01-01 RX ORDER — RIOCIGUAT 2.5 MG/1
TABLET, FILM COATED ORAL
COMMUNITY
Start: 2022-01-01

## 2022-01-01 RX ORDER — SODIUM CHLORIDE 9 MG/ML
250 INJECTION, SOLUTION INTRAVENOUS ONCE
Status: CANCELLED | OUTPATIENT
Start: 2022-01-01

## 2022-01-01 RX ORDER — SENNA PLUS 8.6 MG/1
2 TABLET ORAL DAILY
COMMUNITY

## 2022-01-01 RX ORDER — CALCIUM CARBONATE 200(500)MG
2 TABLET,CHEWABLE ORAL 4 TIMES DAILY
COMMUNITY

## 2022-01-01 RX ORDER — POLYETHYLENE GLYCOL 3350 17 G/17G
17 POWDER, FOR SOLUTION ORAL DAILY
COMMUNITY

## 2022-01-01 RX ORDER — ESOMEPRAZOLE MAGNESIUM 40 MG/1
40 CAPSULE, DELAYED RELEASE ORAL DAILY
COMMUNITY
Start: 2021-01-01

## 2022-01-01 RX ORDER — SILDENAFIL CITRATE 20 MG/1
TABLET ORAL
COMMUNITY
Start: 2022-01-01

## 2022-01-01 RX ORDER — FUROSEMIDE 40 MG/1
40 TABLET ORAL DAILY PRN
COMMUNITY
Start: 2022-01-01

## 2022-01-01 RX ORDER — SPIRONOLACTONE 25 MG/1
TABLET ORAL
COMMUNITY
Start: 2022-01-01

## 2022-01-01 RX ORDER — SILDENAFIL CITRATE 20 MG/1
TABLET ORAL
COMMUNITY
Start: 2022-01-01 | End: 2022-01-01

## 2022-01-01 RX ORDER — AMIODARONE HYDROCHLORIDE 200 MG/1
200 TABLET ORAL DAILY
COMMUNITY
Start: 2022-01-01

## 2022-01-01 RX ORDER — BISACODYL 5 MG/1
5 TABLET, DELAYED RELEASE ORAL DAILY PRN
COMMUNITY

## 2022-01-01 RX ORDER — RIVAROXABAN 20 MG/1
20 TABLET, FILM COATED ORAL EVERY EVENING
COMMUNITY
Start: 2021-01-01

## 2022-01-01 RX ORDER — RIOCIGUAT 1 MG/1
1 TABLET, FILM COATED ORAL 3 TIMES DAILY
COMMUNITY
Start: 2022-01-01 | End: 2022-01-01

## 2022-01-01 RX ORDER — ASPIRIN 81 MG/1
81 TABLET, CHEWABLE ORAL DAILY
COMMUNITY

## 2022-01-01 RX ORDER — ACETAMINOPHEN 500 MG
500 TABLET ORAL EVERY 4 HOURS PRN
COMMUNITY

## 2022-01-01 RX ORDER — SODIUM ZIRCONIUM CYCLOSILICATE 5 G/5G
15 POWDER, FOR SUSPENSION ORAL DAILY
COMMUNITY

## 2022-01-01 RX ORDER — LANOLIN ALCOHOL/MO/W.PET/CERES
400 CREAM (GRAM) TOPICAL DAILY
COMMUNITY
Start: 2022-01-01

## 2022-01-01 RX ORDER — AMBRISENTAN 10 MG/1
10 TABLET, FILM COATED ORAL DAILY
COMMUNITY
Start: 2022-01-01

## 2022-01-01 RX ORDER — FOLIC ACID 1 MG/1
1 TABLET ORAL DAILY
COMMUNITY

## 2022-01-01 RX ADMIN — SODIUM CHLORIDE 125 MG: 9 INJECTION, SOLUTION INTRAVENOUS at 13:32

## 2022-01-01 RX ADMIN — SODIUM CHLORIDE 125 MG: 9 INJECTION, SOLUTION INTRAVENOUS at 14:08

## 2022-01-01 RX ADMIN — SODIUM CHLORIDE 250 ML: 9 INJECTION, SOLUTION INTRAVENOUS at 13:29

## 2022-01-01 RX ADMIN — DEXAMETHASONE SODIUM PHOSPHATE 12 MG: 10 INJECTION, SOLUTION INTRAMUSCULAR; INTRAVENOUS at 13:20

## 2022-01-01 RX ADMIN — ACETAMINOPHEN 650 MG: 325 TABLET ORAL at 13:13

## 2022-01-01 RX ADMIN — ACETAMINOPHEN 650 MG: 325 TABLET ORAL at 13:24

## 2022-01-01 RX ADMIN — DEXAMETHASONE SODIUM PHOSPHATE 12 MG: 10 INJECTION, SOLUTION INTRAMUSCULAR; INTRAVENOUS at 13:35

## 2022-01-01 RX ADMIN — SODIUM CHLORIDE 125 MG: 9 INJECTION, SOLUTION INTRAVENOUS at 14:07

## 2022-01-01 RX ADMIN — SODIUM CHLORIDE 250 ML: 9 INJECTION, SOLUTION INTRAVENOUS at 13:16

## 2022-01-01 RX ADMIN — SODIUM CHLORIDE 125 MG: 9 INJECTION, SOLUTION INTRAVENOUS at 14:03

## 2022-01-01 RX ADMIN — ACETAMINOPHEN 650 MG: 325 TABLET ORAL at 14:31

## 2022-01-01 RX ADMIN — SODIUM CHLORIDE 125 MG: 9 INJECTION, SOLUTION INTRAVENOUS at 14:21

## 2022-01-01 RX ADMIN — SODIUM CHLORIDE 250 ML: 9 INJECTION, SOLUTION INTRAVENOUS at 14:31

## 2022-01-01 RX ADMIN — DIPHENHYDRAMINE HYDROCHLORIDE 25 MG: 50 INJECTION, SOLUTION INTRAMUSCULAR; INTRAVENOUS at 13:20

## 2022-01-01 RX ADMIN — SODIUM CHLORIDE 250 ML: 9 INJECTION, SOLUTION INTRAVENOUS at 13:15

## 2022-01-01 RX ADMIN — SODIUM CHLORIDE 125 MG: 9 INJECTION, SOLUTION INTRAVENOUS at 15:17

## 2022-01-01 RX ADMIN — SODIUM CHLORIDE 250 ML: 9 INJECTION, SOLUTION INTRAVENOUS at 12:49

## 2022-01-01 RX ADMIN — ACETAMINOPHEN 650 MG: 325 TABLET ORAL at 12:49

## 2022-01-01 RX ADMIN — ACETAMINOPHEN 650 MG: 325 TABLET ORAL at 13:20

## 2022-01-01 RX ADMIN — SODIUM CHLORIDE 250 ML: 9 INJECTION, SOLUTION INTRAVENOUS at 13:21

## 2022-01-01 RX ADMIN — DEXAMETHASONE SODIUM PHOSPHATE 12 MG: 10 INJECTION, SOLUTION INTRAMUSCULAR; INTRAVENOUS at 12:49

## 2022-01-01 RX ADMIN — ACETAMINOPHEN 650 MG: 325 TABLET ORAL at 13:15

## 2022-01-01 RX ADMIN — DEXAMETHASONE SODIUM PHOSPHATE 12 MG: 10 INJECTION, SOLUTION INTRAMUSCULAR; INTRAVENOUS at 14:31

## 2022-02-10 ENCOUNTER — HOSPITAL ENCOUNTER (OUTPATIENT)
Age: 72
End: 2022-02-10
Payer: MEDICARE

## 2022-02-10 DIAGNOSIS — N18.30: Primary | ICD-10-CM

## 2022-02-10 DIAGNOSIS — E55.9: ICD-10-CM

## 2022-02-10 LAB
25-OH VITAMIN D, TOTAL: 55.2 NG/ML (ref 30–100)
ALBUMIN LEVEL: 3.1 G/DL (ref 3.5–5)
ANION GAP SERPL CALC-SCNC: 10.4 MEQ/L (ref 5–15)
BUN SERPL-MCNC: 33 MG/DL (ref 9–20)
CALCIUM SPEC-MCNC: 8.8 MG/DL (ref 8.4–10.2)
CHLORIDE SPEC-SCNC: 109 MMOL/L (ref 98–107)
CO2 SERPL-SCNC: 26 MMOL/L (ref 22–30)
COLOR UR: YELLOW
CREAT BLD-SCNC: 1.2 MG/DL (ref 0.66–1.25)
ESTIMATED GLOMERULAR FILT RATE: 60 ML/MIN (ref 60–?)
GFR (AFRICAN AMERICAN): 72 ML/MIN (ref 60–?)
GLUCOSE: 81 MG/DL (ref 74–100)
HCT VFR BLD CALC: 30.4 % (ref 42–52)
HGB BLD-MCNC: 9.4 G/DL (ref 14.1–18)
MCHC RBC-ENTMCNC: 30.9 G/DL (ref 31.8–35.4)
MCV RBC: 102.4 FL (ref 80–94)
MEAN CORPUSCULAR HEMOGLOBIN: 31.6 PG (ref 27–31.2)
MICRO URNS: (no result)
PH UR: 5.5 [PH] (ref 5–8.5)
PHOSPHOROUS: 4 MG/DL (ref 2.5–4.5)
PLATELET # BLD: 502 K/MM3 (ref 142–424)
POTASSIUM: 5.4 MMOL/L (ref 3.5–5.1)
RBC # BLD AUTO: 2.97 M/MM3 (ref 4.6–6.2)
SODIUM SPEC-SCNC: 140 MMOL/L (ref 136–145)
SP GR UR: 1.02 (ref 1–1.03)
UROBILINOGEN UR QL: 0.2 EU/DL
WBC # BLD AUTO: 5.6 K/MM3 (ref 4.8–10.8)

## 2022-02-10 PROCEDURE — 84155 ASSAY OF PROTEIN SERUM: CPT

## 2022-02-10 PROCEDURE — 81001 URINALYSIS AUTO W/SCOPE: CPT

## 2022-02-10 PROCEDURE — 82306 VITAMIN D 25 HYDROXY: CPT

## 2022-02-10 PROCEDURE — 80069 RENAL FUNCTION PANEL: CPT

## 2022-02-10 PROCEDURE — 36415 COLL VENOUS BLD VENIPUNCTURE: CPT

## 2022-02-10 PROCEDURE — 83970 ASSAY OF PARATHORMONE: CPT

## 2022-02-10 PROCEDURE — 82570 ASSAY OF URINE CREATININE: CPT

## 2022-02-10 PROCEDURE — 85025 COMPLETE CBC W/AUTO DIFF WBC: CPT

## 2022-03-22 PROBLEM — K90.9 MALABSORPTION OF IRON: Status: ACTIVE | Noted: 2022-01-01

## 2022-03-22 PROBLEM — D50.9 IRON DEFICIENCY ANEMIA: Status: ACTIVE | Noted: 2022-01-01

## 2022-03-22 NOTE — PROGRESS NOTES
New Patient Office Visit      Date: 2022     Patient Name: Los Dodd  MRN: [id number]  : 1950  Referring Physician: Gwen Mendenhall    Chief Complaint: Establish care for iron deficiency anemia    History of Present Illness: Los Dodd is a pleasant 71 y.o. male with past medical history of atrial fibrillation, CHF, hypertension who presents today for evaluation of iron deficiency anemia. The patient has been followed by his PCP who is been monitoring CBCs which was notable for iron deficiency anemia over the past several months.  Most recently his hemoglobin was 8.5 in 2022, ferritin 30, iron level 43, transferrin saturation 14%.  He denies any easy bleeding or bruising episodes.  He denies any dark tarry stools.  He states that he has never had a colonoscopy and is not interested in getting one.  He has been on oral iron for the past 6-9 months with no significant improvement of his iron studies.  He denies any cravings for ice or restless leg syndrome symptoms.  He has significant weakness when coming to the clinic today and required a wheelchair for ambulation.  He is on chronic oxygen therapy and forgot it today but improved with oxygen supplementation while in the clinic.    Oncology History:    Oncology/Hematology History    No history exists.       Subjective      Review of Systems:     Constitutional: Negative for fevers, chills, or weight loss  Eyes: Negative for blurred vision or discharge         Ear/Nose/Throat: Negative for difficulty swallowing, sore throat, LAD                                                       Respiratory: Negative for cough, SOA, wheezing                                                                                        Cardiovascular: Negative for chest pain or palpitations                                                                  Gastrointestinal: Negative for nausea, vomiting or diarrhea                                                                      Genitourinary: Negative for dysuria or hematuria                                                                                           Musculoskeletal: Negative for any joint pains or muscle aches                                                                        Neurologic: Negative for any weakness, headaches, dizziness                                                                         Hematologic: Negative for any easy bleeding or bruising                                                                                   Psychiatric: Negative for anxiety or depression                             Past Medical History: History reviewed. No pertinent past medical history.    Past Surgical History: History reviewed. No pertinent surgical history.    Family History: History reviewed. No pertinent family history.    Social History:   Social History     Socioeconomic History   • Marital status:    Tobacco Use   • Smoking status: Unknown If Ever Smoked       Medications:     Current Outpatient Medications:   •  ambrisentan (LETAIRIS) 10 MG tablet, , Disp: , Rfl:   •  amiodarone (PACERONE) 200 MG tablet, Take 200 mg by mouth Daily., Disp: , Rfl:   •  esomeprazole (nexIUM) 40 MG capsule, Take 40 mg by mouth Daily., Disp: , Rfl:   •  folic acid (FOLVITE) 400 MCG tablet, Take 400 mcg by mouth Daily., Disp: , Rfl:   •  furosemide (LASIX) 40 MG tablet, TAKE 1 TABLET BY MOUTH EVERY DAY ONCE DAILY 90 DAYS, Disp: , Rfl:   •  metoprolol tartrate (LOPRESSOR) 25 MG tablet, Take 12.5 mg by mouth 2 (Two) Times a Day., Disp: , Rfl:   •  sildenafil (REVATIO) 20 MG tablet, TAKE 4 TABLETS 3 TIMES A DAY, Disp: , Rfl:   •  Xarelto 20 MG tablet, Take 20 mg by mouth Every Evening., Disp: , Rfl:     Allergies:   Not on File    Objective     Physical Exam:  Vital Signs:   Vitals:    03/22/22 1434 03/22/22 1435   BP: 105/58    Pulse: 106    Resp: 22    Temp: 97.4 °F (36.3 °C)    SpO2: (!) 85%  Comment:  "RA on arrival 95%  Comment: RA after resting and 2L O2   Weight: 65.8 kg (145 lb)    Height: 177.8 cm (70\")    PainSc: 0-No pain      Pain Score    03/22/22 1434   PainSc: 0-No pain     ECOG Performance Status: 2 - Symptomatic, <50% confined to bed    Constitutional: NAD, ECOG 2  Eyes: PERRLA, scleral anicteric  ENT: No LAD, no thyromegaly  Respiratory: CTAB, no wheezing, rales, rhonchi  Cardiovascular: RRR, no murmurs, pulses 2+ bilaterally  Abdomen: soft, NT/ND, no HSM  Musculoskeletal: strength 5/5 bilaterally, no c/c/e  Neurologic: A&O x 3, CN II-XII intact grossly  Psych: mood and affect congruent, no SI or HI    Results Review:   Lab on 03/22/2022   Component Date Value Ref Range Status   • WBC 03/22/2022 5.40  3.40 - 10.80 10*3/mm3 Final   • RBC 03/22/2022 2.65 (A) 4.14 - 5.80 10*6/mm3 Final   • Hemoglobin 03/22/2022 8.1 (A) 13.0 - 17.7 g/dL Final   • Hematocrit 03/22/2022 25.6 (A) 37.5 - 51.0 % Final   • RDW 03/22/2022 16.0 (A) 12.3 - 15.4 % Final   • MCV 03/22/2022 96.7  79.0 - 97.0 fL Final   • MCH 03/22/2022 30.7  26.6 - 33.0 pg Final   • MCHC 03/22/2022 31.8  31.5 - 35.7 g/dL Final   • MPV 03/22/2022 6.3  6.0 - 12.0 fL Final   • Platelets 03/22/2022 449  140 - 450 10*3/mm3 Final   • Neutrophil % 03/22/2022 68.5  42.7 - 76.0 % Final   • Lymphocyte % 03/22/2022 23.5  19.6 - 45.3 % Final   • Monocyte % 03/22/2022 8.0  5.0 - 12.0 % Final   • Neutrophils, Absolute 03/22/2022 3.70  1.70 - 7.00 10*3/mm3 Final   • Lymphocytes, Absolute 03/22/2022 1.30  0.70 - 3.10 10*3/mm3 Final   • Monocytes, Absolute 03/22/2022 0.40  0.10 - 0.90 10*3/mm3 Final       No results found.    Assessment / Plan      Assessment/Plan:   1. Other iron deficiency anemia (Primary)/2. Malabsorption of iron  -Unclear etiology at this time  -Most recent labs from his PCP concerning for iron deficiency anemia  -Currently on oral iron and having malabsorption as he has had no improvement of his iron studies or hemoglobin during that " timeframe  -Discussed transition to IV iron however patient would like to defer discussion until future appointment  -Discussed screening colonoscopy and EGD with the patient and he adamantly refused those procedures  -We will check labs as below and rediscuss IV iron at his next visit  -     CBC & Differential; Future  -     Comprehensive Metabolic Panel; Future  -     Ferritin; Future  -     Folate; Future  -     Vitamin B12; Future  -     Peripheral Blood Smear; Future  -     Iron Profile; Future  -     Beta 2 Microglobulin, Serum; Future  -     RAMONITA + PE; Future  -     Immunoglobulin Free LT Chains Blood; Future  -     Lactate Dehydrogenase; Future  -     Haptoglobin; Future  -     CEA; Future        3. Other specified abnormal findings of blood chemistry   -     CEA; Future           Follow Up:   Follow-up in 2 weeks     Young Sesay MD  Hematology and Oncology     Please note that portions of this note may have been completed with a voice recognition program. Efforts were made to edit the dictations, but occasionally words are mistranscribed.

## 2022-04-25 NOTE — TELEPHONE ENCOUNTER
Call to patient to notify that insurance would not cover the Injectafer so will need to come in weekly for 6 weeks to start Ferrlecit.  Los states understood

## 2022-04-25 NOTE — PROGRESS NOTES
Follow Up Office Visit      Date: 2022     Patient Name: Los Dodd  MRN: [id number]  : 1950  Referring Physician: Gwen Mendenhall     Chief Complaint:  Follow-up for iron deficiency anemia     History of Present Illness: Los Dodd is a pleasant 71 y.o. male with past medical history of atrial fibrillation, CHF, hypertension who presents today for evaluation of iron deficiency anemia. The patient has been followed by his PCP who is been monitoring CBCs which was notable for iron deficiency anemia over the past several months.  Most recently his hemoglobin was 8.5 in 2022, ferritin 30, iron level 43, transferrin saturation 14%.  He denies any easy bleeding or bruising episodes.  He denies any dark tarry stools.  He states that he has never had a colonoscopy and is not interested in getting one.  He has been on oral iron for the past 6-9 months with no significant improvement of his iron studies.  He denies any cravings for ice or restless leg syndrome symptoms.  He has significant weakness when coming to the clinic today and required a wheelchair for ambulation.     Interval History:  Presents to clinic for follow-up.  Continues to have significant weakness and fatigue.  Denies any easy bleeding or bruising episodes.  Denies any dark tarry stools    Oncology History:    Oncology/Hematology History    No history exists.       Subjective      Review of Systems:   Constitutional: Negative for fevers, chills, or weight loss  Eyes: Negative for blurred vision or discharge         Ear/Nose/Throat: Negative for difficulty swallowing, sore throat, LAD                                                       Respiratory: Negative for cough, SOA, wheezing                                                                                        Cardiovascular: Negative for chest pain or palpitations                                                                  Gastrointestinal: Negative for  "nausea, vomiting or diarrhea                                                                     Genitourinary: Negative for dysuria or hematuria                                                                                           Musculoskeletal: Negative for any joint pains or muscle aches                                                                        Neurologic: Negative for any weakness, headaches, dizziness                                                                         Hematologic: Negative for any easy bleeding or bruising                                                                                   Psychiatric: Negative for anxiety or depression                          Past Medical History/Past Surgical History/ Family History/ Social History: Reviewed by me and unchanged from my previous documentation done on March 2022.     Medications:     Current Outpatient Medications:   •  Adempas 1 MG tablet, Take 1 mg by mouth 3 (Three) Times a Day., Disp: , Rfl:   •  ambrisentan (LETAIRIS) 10 MG tablet, , Disp: , Rfl:   •  amiodarone (PACERONE) 200 MG tablet, Take 200 mg by mouth Daily., Disp: , Rfl:   •  esomeprazole (nexIUM) 40 MG capsule, Take 40 mg by mouth Daily., Disp: , Rfl:   •  folic acid (FOLVITE) 400 MCG tablet, Take 400 mcg by mouth Daily., Disp: , Rfl:   •  furosemide (LASIX) 40 MG tablet, TAKE 1 TABLET BY MOUTH EVERY DAY ONCE DAILY 90 DAYS, Disp: , Rfl:   •  metoprolol tartrate (LOPRESSOR) 25 MG tablet, Take 12.5 mg by mouth 2 (Two) Times a Day., Disp: , Rfl:   •  Xarelto 20 MG tablet, Take 20 mg by mouth Every Evening., Disp: , Rfl:     Allergies:   Not on File    Objective     Physical Exam:  Vital Signs:   Vitals:    04/25/22 1124   BP: 98/58   Pulse: 84   Resp: 16   Temp: 98 °F (36.7 °C)   TempSrc: Infrared   SpO2: 95%   Weight: 63.4 kg (139 lb 11.2 oz)   Height: 177.8 cm (70\")   PainSc:   5   PainLoc: Back  Comment: shoulders     Pain Score    04/25/22 1124   PainSc:   5 "   PainLoc: Back  Comment: shoulders     ECOG Performance Status: 2 - Symptomatic, <50% confined to bed    Constitutional: NAD, ECOG 2  Eyes: PERRLA, scleral anicteric  ENT: No LAD, no thyromegaly  Respiratory: CTAB, no wheezing, rales, rhonchi  Cardiovascular: RRR, no murmurs, pulses 2+ bilaterally  Abdomen: soft, NT/ND, no HSM  Musculoskeletal: strength 5/5 bilaterally, no c/c/e  Neurologic: A&O x 3, CN II-XII intact grossly    Results Review:   No visits with results within 2 Week(s) from this visit.   Latest known visit with results is:   Lab on 03/22/2022   Component Date Value Ref Range Status   • Glucose 03/22/2022 113 (A) 65 - 99 mg/dL Final   • BUN 03/22/2022 51 (A) 8 - 23 mg/dL Final   • Creatinine 03/22/2022 1.85 (A) 0.76 - 1.27 mg/dL Final   • Sodium 03/22/2022 137  136 - 145 mmol/L Final   • Potassium 03/22/2022 4.8  3.5 - 5.2 mmol/L Final    Slight hemolysis detected by analyzer. Results may be affected.   • Chloride 03/22/2022 102  98 - 107 mmol/L Final   • CO2 03/22/2022 27.0  22.0 - 29.0 mmol/L Final   • Calcium 03/22/2022 9.7  8.6 - 10.5 mg/dL Final   • Total Protein 03/22/2022 7.5  6.0 - 8.5 g/dL Final   • Albumin 03/22/2022 3.20 (A) 3.50 - 5.20 g/dL Final   • ALT (SGPT) 03/22/2022 14  1 - 41 U/L Final   • AST (SGOT) 03/22/2022 20  1 - 40 U/L Final   • Alkaline Phosphatase 03/22/2022 85  39 - 117 U/L Final   • Total Bilirubin 03/22/2022 0.2  0.0 - 1.2 mg/dL Final   • Globulin 03/22/2022 4.3  gm/dL Final    Calculated Result   • A/G Ratio 03/22/2022 0.7  g/dL Final   • BUN/Creatinine Ratio 03/22/2022 27.6 (A) 7.0 - 25.0 Final   • Anion Gap 03/22/2022 8.0  5.0 - 15.0 mmol/L Final   • eGFR 03/22/2022 38.7 (A) >60.0 mL/min/1.73 Final    National Kidney Foundation and American Society of Nephrology (ASN) Task Force recommended calculation based on the Chronic Kidney Disease Epidemiology Collaboration (CKD-EPI) equation refit without adjustment for race.   • Ferritin 03/22/2022 42.50  30.00 - 400.00  ng/mL Final   • Folate 03/22/2022 >20.00  4.78 - 24.20 ng/mL Final   • Vitamin B-12 03/22/2022 848  211 - 946 pg/mL Final   • Performed by: 03/22/2022 Dr. Rehana Grace   Final   • Pathologist Interpretation 03/22/2022 Normocytic anemia with minimal anisocytosis and slight polychromasia  Normal white cell counts and morphologies  Platelets adequate in number and morphology  Agree with differential     Final   • Iron 03/22/2022 24 (A) 59 - 158 mcg/dL Final   • Iron Saturation 03/22/2022 6 (A) 20 - 50 % Final   • Transferrin 03/22/2022 260  200 - 360 mg/dL Final   • TIBC 03/22/2022 387  298 - 536 mcg/dL Final   • Beta-2 Microglobulin 03/22/2022 12.2 (A) 0.8 - 2.2 mg/L Final   • IgG 03/22/2022 1966 (A) 603 - 1613 mg/dL Final   • IgA 03/22/2022 390  61 - 437 mg/dL Final   • IgM 03/22/2022 115  20 - 172 mg/dL Final   • Total Protein 03/22/2022 7.1  6.0 - 8.5 g/dL Final   • Albumin 03/22/2022 2.9  2.9 - 4.4 g/dL Final   • Alpha-1-Globulin 03/22/2022 0.4  0.0 - 0.4 g/dL Final   • Alpha-2-Globulin 03/22/2022 0.8  0.4 - 1.0 g/dL Final   • Beta Globulin 03/22/2022 1.0  0.7 - 1.3 g/dL Final   • Gamma Globulin 03/22/2022 2.0 (A) 0.4 - 1.8 g/dL Final   • M-Jalen 03/22/2022 Not Observed  Not Observed g/dL Final   • Globulin 03/22/2022 4.2 (A) 2.2 - 3.9 g/dL Final   • A/G Ratio 03/22/2022 0.7  0.7 - 1.7 Final   • Immunofixation Reflex, Serum 03/22/2022 Comment   Final    No monoclonality detected.   • Please note 03/22/2022 Comment   Final    Protein electrophoresis scan will follow via computer, mail, or   delivery.   • Free Light Chain, Kappa 03/22/2022 222.2 (A) 3.3 - 19.4 mg/L Final   • Free Lambda Light Chains 03/22/2022 108.3 (A) 5.7 - 26.3 mg/L Final   • Kappa/Lambda Ratio 03/22/2022 2.05 (A) 0.26 - 1.65 Final   • LDH 03/22/2022 208  135 - 225 U/L Final   • Haptoglobin 03/22/2022 138  30 - 200 mg/dL Final   • CEA 03/22/2022 6.19  ng/mL Final   • WBC 03/22/2022 5.40  3.40 - 10.80 10*3/mm3 Final   • RBC 03/22/2022 2.65  (A) 4.14 - 5.80 10*6/mm3 Final   • Hemoglobin 03/22/2022 8.1 (A) 13.0 - 17.7 g/dL Final   • Hematocrit 03/22/2022 25.6 (A) 37.5 - 51.0 % Final   • RDW 03/22/2022 16.0 (A) 12.3 - 15.4 % Final   • MCV 03/22/2022 96.7  79.0 - 97.0 fL Final   • MCH 03/22/2022 30.7  26.6 - 33.0 pg Final   • MCHC 03/22/2022 31.8  31.5 - 35.7 g/dL Final   • MPV 03/22/2022 6.3  6.0 - 12.0 fL Final   • Platelets 03/22/2022 449  140 - 450 10*3/mm3 Final   • Neutrophil % 03/22/2022 68.5  42.7 - 76.0 % Final   • Lymphocyte % 03/22/2022 23.5  19.6 - 45.3 % Final   • Monocyte % 03/22/2022 8.0  5.0 - 12.0 % Final   • Neutrophils, Absolute 03/22/2022 3.70  1.70 - 7.00 10*3/mm3 Final   • Lymphocytes, Absolute 03/22/2022 1.30  0.70 - 3.10 10*3/mm3 Final   • Monocytes, Absolute 03/22/2022 0.40  0.10 - 0.90 10*3/mm3 Final       No results found.    Assessment / Plan      Assessment/Plan:   1. Other iron deficiency anemia (Primary)/2. Malabsorption of iron  -Unclear etiology at this time  -Most recent labs from his PCP concerning for iron deficiency anemia  -SPEP with no evidence of monoclonal protein or M spike  -Beta-2 microglobulin elevated, kappa/lambda free light chains elevated but ratio 2.0  -LDH/haptoglobin within normal limits  -Vitamin B12/folate within normal limits  -Currently on oral iron and having malabsorption as he has had no improvement of his iron studies or hemoglobin during that timeframe  -Repeat iron studies in March 2022 consistent with iron deficiency anemia  -Plan to transition to IV iron with Injectafer x2 weekly doses  -Patient with a mildly elevated CEA.  Previously discussed screening colonoscopy and EGD with the patient and he adamantly refused those procedures.  Again discussed this today and he continues to refuse an EGD or colonoscopy at this time    Los Dodd reports a pain score of 5.  Given his pain assessment as noted, treatment options were discussed and the following options were decided upon as a follow-up  plan to address the patient's pain: continuation of current treatment plan for pain.       Follow Up:   Follow-up in 3 months     Young Sesay MD  Hematology and Oncology     Please note that portions of this note may have been completed with a voice recognition program. Efforts were made to edit the dictations, but occasionally words are mistranscribed.

## 2022-04-25 NOTE — TELEPHONE ENCOUNTER
----- Message from Justyna Wilburn sent at 4/25/2022 11:49 AM EDT -----  Regarding: RE: Injectafer  Preferred drugs for anthem are ferrlecit, infed, venofer. Thank you    ----- Message -----  From: Naye Brumfield RN  Sent: 4/25/2022  11:41 AM EDT  To: Mamie Hansen, Trinidad Garcia, Justyna Wilburn  Subject: Injectafer                                       To start injectafer on 05/02/2022.  Thanks!

## 2022-06-16 NOTE — TELEPHONE ENCOUNTER
ABRAHAN WITH [facility] ([phone]) CALLING TO GET FAX NUMBER TO FAX OVER P[facility] RECENT LABS.

## 2022-07-18 NOTE — PROGRESS NOTES
Follow Up Office Visit      Date: 2022     Patient Name: Los Dodd  MRN: [id number]  : 1950  Referring Physician: Gwen Mendenhall     Chief Complaint:  Follow-up for iron deficiency anemia     History of Present Illness: Los Dodd is a pleasant 71 y.o. male with past medical history of atrial fibrillation, CHF, hypertension who presents today for evaluation of iron deficiency anemia. The patient has been followed by his PCP who is been monitoring CBCs which was notable for iron deficiency anemia over the past several months.  Most recently his hemoglobin was 8.5 in 2022, ferritin 30, iron level 43, transferrin saturation 14%.  He denies any easy bleeding or bruising episodes.  He denies any dark tarry stools.  He states that he has never had a colonoscopy and is not interested in getting one.  He has been on oral iron for the past 6-9 months with no significant improvement of his iron studies.  He denies any cravings for ice or restless leg syndrome symptoms.  He has significant weakness when coming to the clinic today and required a wheelchair for ambulation.      Interval History:  Presents to clinic for follow-up.  Status post IV Ferrlecit completed in 2022.  Had some increased energy.  Denies any dark tarry stools or bright red blood per rectum.  Continues to have significant bruising related to his Xarelto.    Oncology History:    Oncology/Hematology History    No history exists.       Subjective      Review of Systems:   Constitutional: Negative for fevers, chills, or weight loss  Eyes: Negative for blurred vision or discharge         Ear/Nose/Throat: Negative for difficulty swallowing, sore throat, LAD                                                       Respiratory: Negative for cough, SOA, wheezing                                                                                        Cardiovascular: Negative for chest pain or palpitations                                                                   Gastrointestinal: Negative for nausea, vomiting or diarrhea                                                                     Genitourinary: Negative for dysuria or hematuria                                                                                           Musculoskeletal: Negative for any joint pains or muscle aches                                                                        Neurologic: Negative for any weakness, headaches, dizziness                                                                         Hematologic: Negative for any easy bleeding or bruising                                                                                   Psychiatric: Negative for anxiety or depression                          Past Medical History/Past Surgical History/ Family History/ Social History: Reviewed by me and unchanged from my previous documentation done on April 2022.     Medications:     Current Outpatient Medications:   •  Adempas 2.5 MG tablet, , Disp: , Rfl:   •  ambrisentan (LETAIRIS) 10 MG tablet, , Disp: , Rfl:   •  amiodarone (PACERONE) 200 MG tablet, Take 200 mg by mouth Daily., Disp: , Rfl:   •  esomeprazole (nexIUM) 40 MG capsule, Take 40 mg by mouth Daily., Disp: , Rfl:   •  folic acid (FOLVITE) 400 MCG tablet, Take 400 mcg by mouth Daily., Disp: , Rfl:   •  furosemide (LASIX) 40 MG tablet, TAKE 1 TABLET BY MOUTH EVERY DAY ONCE DAILY 90 DAYS, Disp: , Rfl:   •  metoprolol tartrate (LOPRESSOR) 25 MG tablet, Take 12.5 mg by mouth 2 (Two) Times a Day., Disp: , Rfl:   •  sildenafil (REVATIO) 20 MG tablet, TAKE 4 TABLETS 3 TIMES A DAY, Disp: , Rfl:   •  spironolactone (ALDACTONE) 25 MG tablet, TAKE 1 TABLET BY MOUTH EVERY DAY FOR 14 DAYS, Disp: , Rfl:   •  Xarelto 20 MG tablet, Take 20 mg by mouth Every Evening., Disp: , Rfl:     Allergies:   Allergies   Allergen Reactions   • Tetanus Toxoids Other (See Comments)     Pt unsure of previous reaction  "      Objective     Physical Exam:  Vital Signs:   Vitals:    07/18/22 1152   BP: 95/52  Comment: DON   Pulse: 74   Resp: 24   Temp: 97.5 °F (36.4 °C)   TempSrc: Infrared   SpO2: (!) 88%  Comment: RA   Weight: 61.2 kg (135 lb)   Height: 177.8 cm (70\")   PainSc: 5  Comment: Knees/Ankle/Back/Neck/Shoulders     Pain Score    07/18/22 1152   PainSc: 5  Comment: Knees/Ankle/Back/Neck/Shoulders     ECOG Performance Status: 2 - Symptomatic, <50% confined to bed    Constitutional: NAD, ECOG 2  Eyes: PERRLA, scleral anicteric  ENT: No LAD, no thyromegaly  Respiratory: CTAB, no wheezing, rales, rhonchi  Cardiovascular: RRR, no murmurs, pulses 2+ bilaterally  Abdomen: soft, NT/ND, no HSM  Musculoskeletal: strength 5/5 bilaterally, no c/c/e  Neurologic: A&O x 3, CN II-XII intact grossly    Results Review:   Lab on 07/18/2022   Component Date Value Ref Range Status   • WBC 07/18/2022 4.90  3.40 - 10.80 10*3/mm3 Final   • RBC 07/18/2022 3.18 (A) 4.14 - 5.80 10*6/mm3 Final   • Hemoglobin 07/18/2022 9.1 (A) 13.0 - 17.7 g/dL Final   • Hematocrit 07/18/2022 29.8 (A) 37.5 - 51.0 % Final   • RDW 07/18/2022 17.6 (A) 12.3 - 15.4 % Final   • MCV 07/18/2022 93.6  79.0 - 97.0 fL Final   • MCH 07/18/2022 28.6  26.6 - 33.0 pg Final   • MCHC 07/18/2022 30.5 (A) 31.5 - 35.7 g/dL Final   • MPV 07/18/2022 6.1  6.0 - 12.0 fL Final   • Platelets 07/18/2022 427  140 - 450 10*3/mm3 Final   • Neutrophil % 07/18/2022 57.6  42.7 - 76.0 % Final   • Lymphocyte % 07/18/2022 35.4  19.6 - 45.3 % Final   • Monocyte % 07/18/2022 7.0  5.0 - 12.0 % Final   • Neutrophils, Absolute 07/18/2022 2.80  1.70 - 7.00 10*3/mm3 Final   • Lymphocytes, Absolute 07/18/2022 1.70  0.70 - 3.10 10*3/mm3 Final   • Monocytes, Absolute 07/18/2022 0.30  0.10 - 0.90 10*3/mm3 Final       No results found.    Assessment / Plan      Assessment/Plan:   1. Other iron deficiency anemia (Primary)/2. Malabsorption of iron  -Unclear etiology at this time  -Most recent labs from his PCP " concerning for iron deficiency anemia  -SPEP with no evidence of monoclonal protein or M spike  -Beta-2 microglobulin elevated, kappa/lambda free light chains elevated but ratio 2.0  -LDH/haptoglobin within normal limits  -Vitamin B12/folate within normal limits  -Previously intolerant to oral iron secondary to malabsorption as he had no improvement of his iron studies while on the medication  -Repeat iron studies in March 2022 consistent with iron deficiency anemia  -Status post IV Ferrlecit completing in June 2022  -Repeat hemoglobin 9.1 in July 2022.  Iron studies pending  -Again advised EGD and colonoscopy as he did not have a significant benefit with IV iron and has previously an elevated CEA.  Patient again refusing the procedure    Follow Up:   Follow-up in 3 months with repeat iron studies     Young Sesay MD  Hematology and Oncology     Please note that portions of this note may have been completed with a voice recognition program. Efforts were made to edit the dictations, but occasionally words are mistranscribed.

## 2022-10-07 PROBLEM — E87.5 HYPERKALEMIA: Status: ACTIVE | Noted: 2022-01-01

## 2022-10-07 PROBLEM — J18.9 PNEUMONIA OF BOTH LUNGS: Status: ACTIVE | Noted: 2022-01-01

## 2022-10-07 PROBLEM — I48.91 UNSPECIFIED ATRIAL FIBRILLATION: Status: ACTIVE | Noted: 2022-01-01

## 2022-10-07 PROBLEM — D63.8 ANEMIA WITH CHRONIC ILLNESS: Status: ACTIVE | Noted: 2022-01-01

## 2022-10-07 PROBLEM — N18.30 STAGE 3 CHRONIC KIDNEY DISEASE: Status: ACTIVE | Noted: 2022-01-01

## 2022-10-07 PROBLEM — J90 LOCULATED PLEURAL EFFUSION: Status: ACTIVE | Noted: 2022-01-01

## 2022-10-07 PROBLEM — Z93.1 FEEDING BY G-TUBE: Status: ACTIVE | Noted: 2022-01-01

## 2022-10-07 PROBLEM — I27.21 WHO GROUP 1 PULMONARY ARTERIAL HYPERTENSION: Status: ACTIVE | Noted: 2022-01-01

## 2022-10-07 PROBLEM — F14.10 COCAINE ABUSE: Status: ACTIVE | Noted: 2022-01-01

## 2022-10-07 PROBLEM — A41.9 SEPTIC SHOCK: Status: ACTIVE | Noted: 2022-01-01

## 2022-10-07 PROBLEM — R06.89 RESPIRATORY INSUFFICIENCY: Status: ACTIVE | Noted: 2022-01-01

## 2022-10-07 PROBLEM — M54.50 LOW BACK PAIN: Status: ACTIVE | Noted: 2022-01-01

## 2022-10-07 PROBLEM — R06.02 SHORTNESS OF BREATH: Status: ACTIVE | Noted: 2022-01-01

## 2022-10-07 PROBLEM — I50.30 UNSPECIFIED DIASTOLIC (CONGESTIVE) HEART FAILURE: Status: ACTIVE | Noted: 2022-01-01

## 2022-10-07 PROBLEM — Q79.9: Status: ACTIVE | Noted: 2022-01-01

## 2022-10-07 PROBLEM — I25.10: Status: ACTIVE | Noted: 2022-01-01

## 2022-10-07 PROBLEM — J96.22 ACUTE ON CHRONIC RESPIRATORY FAILURE WITH HYPOXIA AND HYPERCAPNIA: Status: ACTIVE | Noted: 2022-01-01

## 2022-10-07 PROBLEM — J96.21 ACUTE ON CHRONIC RESPIRATORY FAILURE WITH HYPOXIA AND HYPERCAPNIA: Status: ACTIVE | Noted: 2022-01-01

## 2022-10-07 PROBLEM — R65.21 SEPTIC SHOCK: Status: ACTIVE | Noted: 2022-01-01

## 2022-10-24 NOTE — PROGRESS NOTES
Nursing Home History and Physical       Rafaelmichelle Hodgson DO []  [facility] []  [address] [place] [address]  Phone: [phone]  Fax: [phone] Tamika Cervantes MD []  Richard Srivastava DO [x]   [address] [place] [address]  Phone: [phone]  Fax: [phone]     PATIENT NAME: Los Dodd                                                                          YOB: 1950           DATE OF SERVICE: 10/20/2022  FACILITY:  []  [facility]   [] [facility]   [] [facility]    [x]  [facility]  []  [facility] []  [facility]      CHIEF COMPLAINT:  Nursing facility admission       HISTORY OF PRESENT ILLNESS:   Patient is a 71-year-old white male with a history of pulmonary hypertension, chronic hypoxia, CKD, paroxysmal atrial fibrillation, and hyperkalemia who was recently hospitalized at [facility] for progressive dyspnea on exertion patient was noted to have a loculated pleural effusion for which she underwent chest tube placement.  Transudative fluid was removed and chest tube was removed by the time of discharge.  Renal function was also a concern but improved with diuresis.    On exam today, patient resting comfortably in his bed.  He denied any significant shortness of breath or chest pain.  He did request to be able to have his albuterol inhaler for intermittent episodes of shortness of breath (baseline).  He has been making fair progress with physical therapy.  Fortunately, patient developed new onset bleeding from urethra with bright red blood.    PAST MEDICAL & SURGICAL HISTORY:   Past Medical History:   Diagnosis Date   • Hypertension    • Pneumonia       Past Surgical History:   Procedure Laterality Date   • CATARACT EXTRACTION N/A    • IVC FILTER RETRIEVAL      placement   • KNEE SURGERY N/A          MEDICATIONS:  I have reviewed and reconciled the patients medication list in the patients chart at the skilled nursing facility on 10/20/2022.       ALLERGIES:  Allergies   Allergen Reactions   • Tetanus Toxoids Other (See Comments)     Pt unsure of previous reaction         SOCIAL HISTORY:  Social History     Socioeconomic History   • Marital status:    Tobacco Use   • Smoking status: Never   • Smokeless tobacco: Never   Vaping Use   • Vaping Use: Never used   Substance and Sexual Activity   • Alcohol use: Yes     Comment: 1 drink per week   • Drug use: Not Currently     Types: Marijuana     Comment: history of cocaine abuse   • Sexual activity: Defer       FAMILY HISTORY:  Family History   Problem Relation Age of Onset   • Diabetes Mother    • Heart disease Father    • Heart disease Brother         REVIEW OF SYSTEMS:  Review of Systems   Constitutional: Negative for chills, fatigue and fever.   HENT: Negative for congestion, ear pain, rhinorrhea, sinus pressure and sore throat.    Eyes: Negative for visual disturbance.   Respiratory: Negative for cough, chest tightness, shortness of breath and wheezing.    Cardiovascular: Negative for chest pain, palpitations and leg swelling.   Gastrointestinal: Negative for abdominal pain, blood in stool, constipation, diarrhea, nausea and vomiting.   Endocrine: Negative for polydipsia and polyuria.   Genitourinary: Negative for dysuria and hematuria.   Musculoskeletal: Negative for arthralgias and back pain.   Skin: Negative for rash.   Neurological: Negative for dizziness, light-headedness, numbness and headaches.   Psychiatric/Behavioral: Negative for dysphoric mood and sleep disturbance. The patient is not nervous/anxious.          PHYSICAL EXAMINATION:   VITAL SIGNS: /59   Pulse 89   Temp 98.7 °F (37.1 °C)   Resp 18   SpO2 94%     Physical Exam  Vitals and nursing note reviewed.   Constitutional:       Appearance: Normal appearance. He is well-developed.   HENT:      Head: Normocephalic and atraumatic.      Nose: Nose normal.      Mouth/Throat:      Mouth: Mucous membranes are moist.      Pharynx:  No oropharyngeal exudate.   Eyes:      General: No scleral icterus.     Conjunctiva/sclera: Conjunctivae normal.      Pupils: Pupils are equal, round, and reactive to light.   Neck:      Thyroid: No thyromegaly.   Cardiovascular:      Rate and Rhythm: Normal rate and regular rhythm.      Heart sounds: Murmur heard.     No friction rub. No gallop.   Pulmonary:      Effort: Pulmonary effort is normal. No respiratory distress.      Breath sounds: Normal breath sounds. No wheezing.   Abdominal:      General: Bowel sounds are normal. There is no distension.      Palpations: Abdomen is soft.      Tenderness: There is no abdominal tenderness.   Musculoskeletal:         General: No deformity or signs of injury.      Cervical back: Normal range of motion and neck supple.   Lymphadenopathy:      Cervical: No cervical adenopathy.   Skin:     General: Skin is warm and dry.      Findings: No rash.   Neurological:      Mental Status: He is alert and oriented to person, place, and time.   Psychiatric:         Mood and Affect: Mood normal.         Behavior: Behavior normal.         RECORDS REVIEW:   Discharge Summary from [facility] 10/6/2022    ASSESSMENT   Diagnoses and all orders for this visit:    1. Chronic systolic (congestive) heart failure (HCC) (Primary)    2. WHO group 1 pulmonary arterial hypertension (HCC)    3. Paroxysmal atrial fibrillation (HCC)    4. Gross hematuria    5. Stage 3a chronic kidney disease (HCC)    6. Hyperkalemia        PLAN  Hematuria  - New onset.  Okay to hold Xarelto x3 days.  Obtain urinalysis  - Monitor for further signs of bleeding.  Consider urology referral for symptoms not improved.    COPD  - Well-controlled at this time.  Continue albuterol inhaler as needed along with long-acting inhalers.    Pulmonary hypertension  - Continue sildenafil and Ambrisentan    Systolic congestive heart failure  - Most recent ejection fraction of 44%  - Stable on current diuretic regimen.    Atrial  fibrillation  - Continue amiodarone and Xarelto.     Pleural effusion  - Removed via chest tube.            [x]  Discussed Patient in detail with nursing/staff, addressed all needs today.     [x]  Plan of Care Reviewed   [x]  PT/OT Reviewed   [x]  Order Changes  []  Discharge Plans Reviewed  [x]  Advance Directive on file with Nursing Home.   [x]  POA on file with Nursing Home.    [x]  Code Status listed and reviewed.       Richard Srivastava DO.  10/24/2022      **Part of this note may be an electronic transcription/translation of spoken language to printed text using the Dragon Dictation System.**

## 2022-10-26 NOTE — CASE COMMUNICATION
Patient found to be in a home with some safety concerns and need for assistance.  Phone call to listed PCP office and message left requesting a verbal order to include MSW on plan of care for home care services.

## 2022-10-27 NOTE — CASE COMMUNICATION
Patient missed a AIDE visit from [facility] on 10-26-22.     Reason: Patient refused.       For your records only.   As per home health protocol, MD must be notified of missed/cancelled visits; therefore the prescribed frequency was not met.

## 2022-10-27 NOTE — HOME HEALTH
"Met with patient and his friend, Florentino \"Ro\", in the home.  Patient in wheelchair, not using oxygen.  Home is very dirty, cluttered, lots of cats.  Patient does not have medications and states he and Ro are going to [facility] today to get them.  Patient has been on tube feeding for 4 years.  He denies smoking but states his roommate, Joao, does smoke.  Joao wasn't present at the visit as he works on the farm.  Patient appears very dirty and when asked, he said he is able to use the transfer tub bench, which was in the living room, and is able to shower.  He is aware that  aide will be calling him.  Patient is extremely difficult to understand.  He is missing a lot of teeth.  Patient doesn't have a matress on his bed but states it is \"airing out\" and they will put it back on his bed.  He said he has lived there for 3 1/2 years and he doesn't want to move.  He said he pays $300 per month in rent and Joao pays $300. He said Jayjay or Joao or other friends transport him to appointments and to  medications.  He said a friend, Leni Galaviz, does his laundry.  Oxygen contract reviewed with patient and he signed it.  (Given to Addie Garcia, clinical manager).  I provied him with a medi planner and the nurse, Jennifer, states she will follow up to help organize it at next visit when patient will hopefully have all of his medications.  APS referral made due to safety concerns.  (Report # [id number]) There are no fire or smoke alarms in the home.  It looks like they have been removed."

## 2022-10-28 NOTE — CASE COMMUNICATION
Patient missed a AIDE visit from [facility] on 10-28-22.     Reason: Patient declined visit.       For your records only.   As per home health protocol, MD must be notified of missed/cancelled visits; therefore the prescribed frequency was not met.

## 2022-11-01 NOTE — CASE COMMUNICATION
Patient missed a HHAIDE visit from [facility] on 11-1-22.     Reason: Patient declined HHA visit..       For your records only.   As per home health protocol, MD must be notified of missed/cancelled visits; therefore the prescribed frequency was not met.

## 2022-11-01 NOTE — HOME HEALTH
Met with patient and provided resources; oxygen contract signed with patient; APS referral made but not accepted for follow up.  No further social work visits planned at this time.

## 2022-11-01 NOTE — HOME HEALTH
Pt. seen for ST evaluation following stroke with resulting dysarthria and dysphagia.  Pt. demonstrates 0% speech intelligibility with conversational speech due to fast rate, imprecise articulation and speech errors.  Pt. is currently NPO and has a PEG tube for alternative means of nutrition.  Pt.'s caregiver reports that pt's speech was difficult to understand prior to the stroke but is even more so difficult to understand following the stroke.  She also reports that he has attempted po intake since being home, however due to choking episodes he has stopped po intake and is solely using PEG tube for nutrition.  Pt. had PEG tube previously but it is unclear if he was NPO prior to stroke.  Pt. has had multiple swallow tests revealing aspiration of thin, honey thick liquids and medications becoming lodged at the level of the vallecullae.  Pt. educated on risk of aspiration if he choose to eat/drink by mouth.  Pt. verbalizes understanding.

## 2022-11-02 NOTE — HOME HEALTH
Routine Visit Note: Patient sitting in recliner upon therapist arrival to home.     Skill/education provided: patient and male caregiver present during visit.  patient given written seated bilateral LE HEP with verbal instruction and demonstration completed at visit.     Patient/caregiver response: patient and caregiver verbalize understanding of correct technique for HEP performance.     Plan for next visit: n/a.  patient discharged from PT home care services.  Patient currently at max functional level in his home at this time.  2 visits authorized via insurance have been completed.

## 2022-11-03 NOTE — CASE COMMUNICATION
Patient missed a AIDE visit from [facility] on 11-3-22.     Reason: Visits not approved .       For your records only.   As per home health protocol, MD must be notified of missed/cancelled visits; therefore the prescribed frequency was not met.

## 2022-11-03 NOTE — HOME HEALTH
SN visit for medication and enteral feeding teaching. Patient is crushing his own meds and instilling feeding and water as ordered. No complications.

## 2022-11-11 NOTE — HOME HEALTH
Therapy interventions focused on dysphagia therapy in order to minimize risk of aspiration.  Pt. has been recommended to follow NPO status and use PEG tube for all nutrition.  Pt. continues to attempt to eat/drink by mouth.  Pt. demonstrating signficant s/s of aspiration with small sips of Sprite.  Pt. noted to cough during and after the swallow with profound wet/gurgly vocal quality to his voice.  He was expectorating thick mucus into a cup after each swallow.  SLP educated pt. on s/s of aspiration, risks of aspiration and diet recommendations.  Pt. verbalizes understanding.  SLP also educated pt. on completing laryngeal strengthening exercises to improve swallow function and pt. able to return demonstrate 1/3 after max verbal and visual cues from therapist.  Pt. instructed to perform exercises multiple times a day as well as use EMST .  Next session to focus on continued education and training regarding swallow function.

## 2022-11-17 NOTE — HOME HEALTH
Patient's living conditions have safety concerns.  Patient was unable to let me in for the nursing visit. Patient had to phone his room mate to come let me in the house.   No fire alarms, No fire extinguisher.  Numerous (more than 7) uncontrolled pets.   Space heater too close to patient's bed. less than 4 feet apart.

## 2022-11-21 NOTE — HOME HEALTH
Routine Visit Note:    Skill/education provided: HEP training, pressure relief-assisted chair push up and w/c cushion, ADL retraining w/ AE, 02 safety/home safety    Patient/caregiver response: Pt tolerated session well    Plan for next visit: upgrade HEP, functional TTB/bed transfer training    Other pertinent info:

## 2022-11-30 NOTE — HOME HEALTH
Therapy interventions focused on improving swallow function in order to decrease risk of aspiration with po intake.  Therapy focused on education and training in regards to swallow strategies.  Swallow strategies include small spoonfuls of ice chips with hard/effortful swallow, hard throat clear and reswallow.  Therapy also focused on education regarding overt s/s of aspiration and diet recommendations.  Pt. educated on safest oral intake due to noncompliance with NPO status and risks associated with eating.  Pt. verbalizes understanding.  Caregiver also educated on safest diet consistency and s/s of aspiration as well as swallow strategies, he also verbalizes understanding.  Next session to focus on continued use of hard/effortful swallow with ice chips.

## 2022-12-02 NOTE — HOME HEALTH
Reassessment Visit Note:    Skill/education provided: Increase noble edema- pt ed for elevation, ankle pumps, compression sock, monitoring of skin. Limited movement/activity today d/t jt pain. Reinforced HEP, functional transfers and use of mechanical advantage, pt familiar w/ sliding board to get back into bed and elevate feet- need training to evaluate possible independence. Cg'er reports shower TTB when well and pt was able to bath all areas he could reach. Reassessment completed, recommend cont OT towards POC goals.    Patient/caregiver response: pt presents w/ increase generalized jt pain w/ change in barometric pressure    Plan for next visit: sliding board transfer    Other pertinent info:

## 2022-12-09 ENCOUNTER — HOME CARE VISIT (OUTPATIENT)
Dept: HOME HEALTH SERVICES | Facility: HOME HEALTHCARE | Age: 72
End: 2022-12-09